# Patient Record
Sex: MALE | Race: WHITE | NOT HISPANIC OR LATINO | URBAN - METROPOLITAN AREA
[De-identification: names, ages, dates, MRNs, and addresses within clinical notes are randomized per-mention and may not be internally consistent; named-entity substitution may affect disease eponyms.]

---

## 2017-11-27 ENCOUNTER — IMPORTED ENCOUNTER (OUTPATIENT)
Dept: URBAN - METROPOLITAN AREA CLINIC 1 | Facility: CLINIC | Age: 72
End: 2017-11-27

## 2017-11-27 PROBLEM — E11.9: Noted: 2017-11-27

## 2017-11-27 PROBLEM — Z96.1: Noted: 2017-11-27

## 2017-11-27 PROBLEM — Z79.84: Noted: 2017-11-27

## 2017-11-27 PROBLEM — H35.3111: Noted: 2017-11-27

## 2017-11-27 PROBLEM — H01.002: Noted: 2017-11-27

## 2017-11-27 PROBLEM — H04.123: Noted: 2017-11-27

## 2017-11-27 PROBLEM — H01.005: Noted: 2017-11-27

## 2017-11-27 PROBLEM — H16.143: Noted: 2017-11-27

## 2017-11-27 PROBLEM — H26.493: Noted: 2017-11-27

## 2017-11-27 PROCEDURE — 92014 COMPRE OPH EXAM EST PT 1/>: CPT

## 2017-11-27 NOTE — PATIENT DISCUSSION
1.  DM Type II without sign of diabetic retinopathy and no blot heme on dilated retinal examination today OU No Macular Edema: Stable. Discussed the pathophysiology of diabetes and its effect on the eye and risk of blindness. Stressed the importance of strong glucose control. Advised of importance of at least yearly dilated examinations but to contact us immediately for any problems or concerns. 2. Type II diabetes controlled by oral medications. 3.   ARMD OD early/dry/stable. Importance of daily AREDS II study multivitamin and Amsler Grid checks discussed with patient. Patient to follow-up immediately with any new onset of decreased vision and/or metamorphopsia. 4. JAKE w/ PEK OU: Stable. The continuation of artificial tears were recommended. 5.  PCO OU: Observe and consider yag cap when pt feels pco visually significant and visual acuity decreases to appropriate level. 6. Blepharitis anterior type OU- Continue daily warm compresses and lid scrubs were recommended. 7. Pseudophakia OU- Doing well. 8.  Patient defers the refraction at today's visit    9. Return for an appointment for a 27 in 1 year with Dr. Edu Pyle.

## 2018-11-26 ENCOUNTER — IMPORTED ENCOUNTER (OUTPATIENT)
Dept: URBAN - METROPOLITAN AREA CLINIC 1 | Facility: CLINIC | Age: 73
End: 2018-11-26

## 2018-11-26 PROBLEM — H16.143: Noted: 2018-11-26

## 2018-11-26 PROBLEM — H35.3111: Noted: 2018-11-26

## 2018-11-26 PROBLEM — H01.002: Noted: 2018-11-26

## 2018-11-26 PROBLEM — Z96.1: Noted: 2018-11-26

## 2018-11-26 PROBLEM — H04.123: Noted: 2018-11-26

## 2018-11-26 PROBLEM — E11.9: Noted: 2018-11-26

## 2018-11-26 PROBLEM — H01.005: Noted: 2018-11-26

## 2018-11-26 PROBLEM — Z79.84: Noted: 2018-11-26

## 2018-11-26 PROCEDURE — 92014 COMPRE OPH EXAM EST PT 1/>: CPT

## 2018-11-26 NOTE — PATIENT DISCUSSION
1.  DM Type II without sign of diabetic retinopathy and no blot heme on dilated retinal examination today OU No Macular Edema: Stable. Discussed the pathophysiology of diabetes and its effect on the eye and risk of blindness. Stressed the importance of strong glucose control. Advised of importance of at least yearly dilated examinations but to contact us immediately for any problems or concerns. 2. Type II diabetes controlled by oral medications. 3.  JAKE w/ PEK OU- Slightly progressed OU. The continuation of artificial tears OU QID were recommended. Consider plugs if no improvement. 4.  Blepharitis anterior type OU- Continue daily hot compresses and lid scrubs were recommended. 5. ARMD OD Early/dry/stable. Importance of daily AREDS II study multivitamin and Amsler Grid checks discussed with patient. Patient to follow-up immediately with any new onset of decreased vision and/or metamorphopsia. 6. Pseudophakia OU - Doing well. 7. Return for an appointment for 27 in 1 year with Dr. Izzy Dickson.

## 2018-11-26 NOTE — PATIENT DISCUSSION
ARMD OD Early/dry/stable. Importance of daily AREDS II study multivitamin and Amsler Grid checks discussed with patient. Patient to follow-up immediately with any new onset of decreased vision and/or metamorphopsia.

## 2019-06-10 ENCOUNTER — HOSPITAL ENCOUNTER (OUTPATIENT)
Dept: PHYSICAL THERAPY | Age: 74
Discharge: HOME | End: 2019-06-10
Payer: MEDICARE

## 2019-06-10 PROCEDURE — 97110 THERAPEUTIC EXERCISES: CPT | Performed by: PHYSICAL THERAPIST

## 2019-06-10 PROCEDURE — 97162 PT EVAL MOD COMPLEX 30 MIN: CPT | Performed by: PHYSICAL THERAPIST

## 2019-06-10 NOTE — PROGRESS NOTES
[address] [facility] AT [facility]  [address] [facility] [address], [facility], [place]  Phone [phone]  Fax [phone] / [address] [address] PHYSICAL THERAPY SERVICES  Patient Name: Layla Hernandez Sr. : 1945   Medical   Diagnosis: Right knee pain [M25.561] Treatment Diagnosis: R knee pain   Onset Date: 19     Ref[facility] Source[facility] Ke[facility] Gowers, [facility] [facility] 6/10/2019   Prior Hospitalization: See medical history Provider #: [id number]   Prior Level of Function: IND   Comorbidities: Diabetes, HBP   Medications: Verified on Patient Summary List   The Plan of Care and following information is based on the information from the initial evaluation.   =================================================================================  Assessment / key information:  Pt is a 74year old male sp R TKA on 19 and he was released from the hospital on 19. Currently he rates his R knee pain a 4-5/10. He presents with miranda hose and bandage on the R knee that has to be on until his next FU on 19. Functional limitations are consistent with recent surgical interventions. Pain is managed with pain medication. Negative for red flags. FOTO: 50/100. Upon evaluation, pt ambulates without AD demonstrating decreased TKE on the R. Pt is able to ascend/descend the stairs with reciprocal pattern demonstrating decreased eccentric quad control on the R. Unable to assess incision secondary to miranda hose and bandage around the R knee. AROM of the R knee 0-122 deg, L knee AROM: 0-126 deg. Pt was able to perform SLR without quad lag on the R.  Pt would benefit from a course of skilled PT to address above deficits to improve functional mobility.   =================================================================================  Eval Complexity: History: MEDIUM  Complexity : 1-2 comorbidities / personal factors will impact the outcome/ POC Exam:HIGH Complexity : 4+ Standardized tests and measures addressing body structure, function, activity limitation and / or participation in recreation  Presentation: MEDIUM Complexity : Evolving with changing characteristics  Clinical Decision Making:MEDIUM Complexity : FOTO score of 26-74Overall Complexity:MEDIUM  Problem List: pain affecting function, decrease ROM, decrease strength, edema affecting function, impaired gait/ balance, decrease ADL/ functional abilitiies, decrease activity tolerance, decrease flexibility/ joint mobility and decrease transfer abilities   Treatment Plan may include any combination of the following: Therapeutic exercise, Therapeutic activities, Neuromuscular re-education, Physical agent/modality, Gait/balance training, Manual therapy and Patient education  Patient / Family readiness to learn indicated by: asking questions and trying to perform skills  Persons(s) to be included in education: patient (P)  Barriers to Learning/Limitations: None  Measures taken:    Patient Goal (s): Get back to normal   Patient self reported health status: good  Rehabilitation Potential: good   Short Term Goals: To be accomplished in  2  weeks:  1. Pt will be IND and compliant with HEP for self-management of symptoms.  Long Term Goals: To be accomplished in  10  weeks:  1. Pt will improve R knee strength 5/5 to improve gait mechanics. 2. Pt will improve eccentric quad strength as noted with ability to perform 6\" step down. 3. Pt will improve B hip strength to 5/5 to improve gait stability on unlevel surfaces. 4. Pt will improve FOTO score to at least 60/100 as a functional indicator of improved mobility.      Frequency / Duration:   Patient to be seen  2-3  times per week for 10  treatments:  Patient / Caregiver education and instruction: exercises  G-Codes (GP): EMILY  Therapist Signature: Aron Henriquez DPT Date: 2/19/7914   Certification Period: 6/10/19-9/9/19 Time: 5:06 PM ===========================================================================================  I certify that the above Physical Therapy Services are being furnished while the patient is under my care. I agree with the treatment plan and certify that this therapy is necessary. Physician Signature:        Date:       Time:     Please sign and return to [facility] or you may fax the signed copy to [phone]. Thank you.

## 2019-06-10 NOTE — PROGRESS NOTES
PT DAILY TREATMENT NOTE - [facility] 3-16    Patient Name: Marca Dakin.  Date:6/10/2019  : 1945  [x]  Patient  Verified  Payor: Janeth Nguyen / Plan: [place] & [place] / Product Type: Medicare /    In time:1140  Out time:1210  Total Treatment Time (min): 30  Total Timed Codes (min): 10  1:1 Treatment Time ( [address] only): 30   Visit #: 1 of 10    Treatment Area: Right knee pain [M25.561]    SUBJECTIVE  Pain Level (0-10 scale): 4-5/10  Any medication changes, allergies to medications, adverse drug reactions, diagnosis change, or new procedure performed?: [x] No    [] Yes (see summary sheet for update)  Subjective functional status/changes:   [] No changes reported  Pt is a 74year old male sp R TKA on 19 and he was released from the hospital on 19. Currently he rates his R knee pain a 4-5/10. He presents with miranda hose and bandage on the R knee that has to be on until his next FU on 19. Functional limitations are consistent with recent surgical interventions. Pain is managed with pain medication. Negative for red flags.  FOTO: 50/100      OBJECTIVE    Modality rationale: decrease edema, decrease inflammation and decrease pain to improve the patients ability to decrease pain post treatment    Min Type Additional Details    [] Estim:  []Unatt       []IFC  []Premod                        []Other:  []w/ice   []w/heat  Position:  Location:    [] Estim: []Att    []TENS instruct  []NMES                    []Other:  []w/US   []w/ice   []w/heat  Position:  Location:    []  Traction: [] Cervical       []Lumbar                       [] Prone          []Supine                       []Intermittent   []Continuous Lbs:  [] before manual  [] after manual    []  Ultrasound: []Continuous   [] Pulsed                           []1MHz   []3MHz Location:  W/cm2:    []  Iontophoresis with dexamethasone         Location: [] Take home patch   [] In clinic   With review HEP  [x]  Ice     []  heat  []  Ice massage  [] Laser   []  Anodyne Position: supine  Location: R knee     []  Laser with stim  []  Other: Position:  Location:    []  Vasopneumatic Device Pressure:       [] lo [] med [] hi   Temperature: [] lo [] med [] hi   [] Skin assessment post-treatment:  []intact []redness- no adverse reaction    []redness - adverse reaction:     20 min [x]Eval                  []Re-Eval       10 min Therapeutic Exercise:  [x] See flow sheet : Reviewed HEP with pt   Rationale: increase ROM and increase strength to improve the patients ability to improve ADL tolerance           With   [x] TE   [] TA   [] neuro   [] other: Patient Education: [x] Review HEP    [] Progressed/Changed HEP based on:   [] positioning   [] body mechanics   [] transfers   [] heat/ice application    [] Graston Education: Explained the effects and benefits of Graston Technique therapy including potential for post treatment soreness and bruising. [] Other:      Other Objective/Functional Measures:  Upon evaluation, pt ambulates without AD demonstrating decreased TKE on the R. Pt is able to ascend/descend the stairs with reciprocal pattern demonstrating decreased eccentric quad control on the R. Unable to assess incision secondary to miranda hose and bandage around the R knee. AROM of the R knee 0-122 deg, L knee AROM: 0-126 deg.  Pt was able to perform SLR without quad lag on the R.      Pain Level (0-10 scale) post treatment: 3-4/10    ASSESSMENT/Changes in Function:   [x]  See Plan of Care  []  See progress note/recertification  []  See Discharge Summary         Progress towards goals / Updated goals:  PER POC    PLAN  []  Upgrade activities as tolerated     [x]  Continue plan of care  []  Update interventions per flow sheet       []  Discharge due to:_  [x]  Other:2-3x/week for 4 weeks     Justification for Eval Code Complexity:  Patient History : arthritis   Examination see exam   Clinical Presentation: evolving  Clinical Decision Making : FOTO : 50 /100 Orlando Wright DPT 6/10/2019  11:39 AM

## 2019-06-11 ENCOUNTER — HOSPITAL ENCOUNTER (OUTPATIENT)
Dept: PHYSICAL THERAPY | Age: 74
Discharge: HOME | End: 2019-06-11
Payer: MEDICARE

## 2019-06-11 PROCEDURE — 97110 THERAPEUTIC EXERCISES: CPT | Performed by: PHYSICAL THERAPIST

## 2019-06-11 NOTE — PROGRESS NOTES
PT DAILY TREATMENT NOTE - [facility] 3-16    Patient Name: Jordy Vergara Sr.  Date:2019  : 1945  [x]  Patient  Verified  Payor: Xiomy Vivar / Plan: [place] MEDICARE PART A & B / Product Type: Medicare /    In time:924  Out time:1019  Total Treatment Time (min): 55  Total Timed Codes (min): 45  1:1 Treatment Time ( [address] only): 24   Visit #: 2 of 10    Treatment Area: Right knee pain [M25.561]    SUBJECTIVE  Pain Level (0-10 scale): 0/10  Any medication changes, allergies to medications, adverse drug reactions, diagnosis change, or new procedure performed?: [x] No    [] Yes (see summary sheet for update)  Subjective functional status/changes:   [x] No changes reported      OBJECTIVE    Modality rationale: decrease edema, decrease inflammation and decrease pain to improve the patients ability to improve post session sorneess    Min Type Additional Details    [] Estim:  []Unatt       []IFC  []Premod                        []Other:  []w/ice   []w/heat  Position:  Location:    [] Estim: []Att    []TENS instruct  []NMES                    []Other:  []w/US   []w/ice   []w/heat  Position:  Location:    []  Traction: [] Cervical       []Lumbar                       [] Prone          []Supine                       []Intermittent   []Continuous Lbs:  [] before manual  [] after manual    []  Ultrasound: []Continuous   [] Pulsed                           []1MHz   []3MHz Location:  W/cm2:    []  Iontophoresis with dexamethasone         Location: [] Take home patch   [] In clinic   10 [x]  Ice     []  heat  []  Ice massage  []  Laser   []  Anodyne Position: R knee   Location: semi-reclined    []  Laser with stim  []  Other: Position:  Location:    []  Vasopneumatic Device Pressure:       [] lo [] med [] hi   Temperature: [] lo [] med [] hi   [] Skin assessment post-treatment:  []intact []redness- no adverse reaction    []redness - adverse reaction:     55 min Therapeutic Exercise:  [] See flow sheet : initiated PT POC Rationale: increase ROM and increase strength to improve the patients ability to improve gait mechanics        With   [] TE   [] TA   [] neuro   [] other: Patient Education: [x] Review HEP    [] Progressed/Changed HEP based on:   [] positioning   [] body mechanics   [] transfers   [] heat/ice application    [] Graston Education: Explained the effects and benefits of Graston Technique therapy including potential for post treatment soreness and bruising. [] Other:      Other Objective/Functional Measures:   Noted quad weakness with functional standing therex   AROM of the R knee 0-122 deg    Pain Level (0-10 scale) post treatment: 0/10    ASSESSMENT/Changes in Function:   Pt tolerated initiation of care without increased pain or discomfort in the R knee. Noted quad weakness secondary to swelling leading to tendon inhibition. Pt educated to elevate the R LE throughout the day to minimize swelling. Pt verbalized understanding. Patient will continue to benefit from skilled PT services to modify and progress therapeutic interventions, address functional mobility deficits, address ROM deficits, address strength deficits, analyze and address soft tissue restrictions and address imbalance/dizziness to attain remaining goals.      []  See Plan of Care  []  See progress note/recertification  []  See Discharge Summary         Progress towards goals / Updated goals:  1st FU visit, initiated PT POC    PLAN  []  Upgrade activities as tolerated     [x]  Continue plan of care  []  Update interventions per flow sheet       []  Discharge due to:_  [x]  Other: check goals    Winston Lopez DPT 6/11/2019  11:11 AM

## 2019-06-13 ENCOUNTER — HOSPITAL ENCOUNTER (OUTPATIENT)
Dept: PHYSICAL THERAPY | Age: 74
Discharge: HOME | End: 2019-06-13
Payer: MEDICARE

## 2019-06-13 PROCEDURE — 97110 THERAPEUTIC EXERCISES: CPT

## 2019-06-13 PROCEDURE — 97014 ELECTRIC STIMULATION THERAPY: CPT

## 2019-06-13 PROCEDURE — 97140 MANUAL THERAPY 1/> REGIONS: CPT

## 2019-06-14 ENCOUNTER — APPOINTMENT (OUTPATIENT)
Dept: PHYSICAL THERAPY | Age: 74
End: 2019-06-14
Payer: MEDICARE

## 2019-06-17 ENCOUNTER — HOSPITAL ENCOUNTER (OUTPATIENT)
Dept: PHYSICAL THERAPY | Age: 74
Discharge: HOME | End: 2019-06-17
Payer: MEDICARE

## 2019-06-17 ENCOUNTER — IMPORTED ENCOUNTER (OUTPATIENT)
Dept: URBAN - METROPOLITAN AREA CLINIC 1 | Facility: CLINIC | Age: 74
End: 2019-06-17

## 2019-06-17 PROBLEM — H43.811: Noted: 2019-06-17

## 2019-06-17 PROCEDURE — 92012 INTRM OPH EXAM EST PATIENT: CPT

## 2019-06-17 PROCEDURE — 97110 THERAPEUTIC EXERCISES: CPT | Performed by: PHYSICAL THERAPIST

## 2019-06-17 NOTE — PROGRESS NOTES
PT DAILY TREATMENT NOTE - [facility]    Patient Name: Omar Chou.  Date:2019  : 1945  [x]  Patient  Verified  Payor: Christina Ashford / Plan: [place] MEDICARE PART A & B / Product Type: Medicare /    In time: 56  Out time:356  Total Treatment Time (min): 62  Total Timed Codes (min): 62  1:1 Treatment Time ( [address] only): 62  Visit #: 4 of 10    Treatment Area: Right knee pain [M25.561]    SUBJECTIVE  Pain Level (0-10 scale): 0/10  Any medication changes, allergies to medications, adverse drug reactions, diagnosis change, or new procedure performed?: [x] No    [] Yes (see summary sheet for update)  Subjective functional status/changes:   [x] No changes reported  Pt saw the doctor. He said I am doing good and to make an appt in 4-6 weeks.        OBJECTIVE    Modality rationale: decrease edema, decrease inflammation and decrease pain to improve the patients ability to improve post session sorneess    Min Type Additional Details    [] Estim:  []Unatt       []IFC  []Premod                        []Other:  []w/ice   []w/heat  Position:  Location:    [] Estim: []Att    []TENS instruct  []NMES                    []Other:  []w/US   []w/ice   []w/heat  Position:  Location:    []  Traction: [] Cervical       []Lumbar                       [] Prone          []Supine                       []Intermittent   []Continuous Lbs:  [] before manual  [] after manual    []  Ultrasound: []Continuous   [] Pulsed                           []1MHz   []3MHz Location:  W/cm2:    []  Iontophoresis with dexamethasone         Location: [] Take home patch   [] In clinic    []  Ice     []  heat  []  Ice massage  []  Laser   []  Anodyne Position: R knee   Location: semi-reclined    []  Laser with stim  []  Other: Position:  Location:    []  Vasopneumatic Device Pressure:       [] lo [] med [] hi   Temperature: [] lo [] med [] hi   [] Skin assessment post-treatment:  []intact []redness- no adverse reaction    []redness - adverse reaction: 62 min Therapeutic Exercise:  [x] See flow sheet : 5 min NC for warm up    Rationale: increase ROM and increase strength to improve the patients ability to improve gait mechanics        With   [] TE   [] TA   [] neuro   [] other: Patient Education: [x] Review HEP    [] Progressed/Changed HEP based on:   [] positioning   [] body mechanics   [] transfers   [] heat/ice application    [] Graston Education: Explained the effects and benefits of Graston Technique therapy including potential for post treatment soreness and bruising. [] Other:      Other Objective/Functional Measures:  VC's for correct therex technique throughout  Incision is healing, no signs of infection  Increased knee swelling in the R knee     Pain Level (0-10 scale) post treatment: 0/10    ASSESSMENT/Changes in Function:   Pt tolerated progressed PREs without increased pain in the R knee. Continue to progress as tolerated. Patient will continue to benefit from skilled PT services to modify and progress therapeutic interventions, address functional mobility deficits, address ROM deficits, address strength deficits, analyze and address soft tissue restrictions and address imbalance/dizziness to attain remaining goals. Progress towards goals / Updated goals: · Short Term Goals: To be accomplished in  2  weeks:  1. Pt will be IND and compliant with HEP for self-management of symptoms. initiated this goals with giving patient HEP 6/13/19   · Long Term Goals: To be accomplished in  10  weeks:  1. Pt will improve R knee strength 5/5 to improve gait mechanics. 2. Pt will improve eccentric quad strength as noted with ability to perform 6\" step down. 3. Pt will improve B hip strength to 5/5 to improve gait stability on unlevel surfaces. 4. Pt will improve FOTO score to at least 60/100 as a functional indicator of improved mobility.          PLAN  []  Upgrade activities as tolerated     [x]  Continue plan of care  []  Update interventions per flow sheet       []  Discharge due to:_  [x]  Other: check goals    Meredith Desouza DPT 6/17/2019  406 PM

## 2019-06-17 NOTE — PATIENT DISCUSSION
1.  PVD w/o Tear OD (no pigmented cells seen or found on today's exam) -- RD Precautions given. No Retinal Tear Hole or Detachment seen or found on today's exam. Patient was cautioned to call our office immediately if they experience   a substantial change in their symptoms such as an increase in floaters persistent flashes loss of visual field (may appear as a shadow or a curtain) or decrease in visual acuity as these may indicate a retinal tear or detachment. 2.  JAKE w/ PEK OU -- The continuation of artificial tears OU QID were recommended. Consider plugs if no improvement. 3.  Blepharitis OU -- Continue daily hot compresses and lid scrubs were recommended. 4. PCO -- Observe. 5. Pseudophakia OU -- Doing well.6. H/o Early Dry ARMD OD7. H/o DM Type II w/o DR / DME [facility] for an appointment in 4-6 WKS for a 10 / DFE (PVD Recheck OD) with Dr. Aria Walsh.

## 2019-06-18 ENCOUNTER — APPOINTMENT (OUTPATIENT)
Dept: PHYSICAL THERAPY | Age: 74
End: 2019-06-18
Payer: MEDICARE

## 2019-06-19 ENCOUNTER — HOSPITAL ENCOUNTER (OUTPATIENT)
Dept: PHYSICAL THERAPY | Age: 74
Discharge: HOME | End: 2019-06-19
Payer: MEDICARE

## 2019-06-19 PROCEDURE — 97140 MANUAL THERAPY 1/> REGIONS: CPT | Performed by: PHYSICAL THERAPIST

## 2019-06-19 PROCEDURE — 97014 ELECTRIC STIMULATION THERAPY: CPT | Performed by: PHYSICAL THERAPIST

## 2019-06-19 NOTE — PROGRESS NOTES
PT DAILY TREATMENT NOTE - [facility] 3-16    Patient Name: Lakeshia Bhatia.  Date:2019  : 1945  [x]  Patient  Verified  Payor: Lisa Reed / Plan: [place] MEDICARE PART A & B / Product Type: Medicare /    In time: 300  Out time: 351  Total Treatment Time (min): 51  Total Timed Codes (min): 36  1:1 Treatment Time ( only): 31  Visit #: 5 of 10    Treatment Area: Right knee pain [M25.561]    SUBJECTIVE  Pain Level (0-10 scale): 3/10  Any medication changes, allergies to medications, adverse drug reactions, diagnosis change, or new procedure performed?: [x] No    [] Yes (see summary sheet for update)  Subjective functional status/changes:   [x] No changes reported  Pt reports increased pain and swelling in the R knee this session.        OBJECTIVE    Modality rationale: decrease edema, decrease inflammation and decrease pain to improve the patients ability to improve post session sorneess    Min Type Additional Details   15 [x] Estim:  [x]Unatt       [x]IFC  []Premod                        []Other:  [x]w/ice   []w/heat  Position: supine with R LE elevated  Location: R knee     [] Estim: []Att    []TENS instruct  []NMES                    []Other:  []w/US   []w/ice   []w/heat  Position:  Location:    []  Traction: [] Cervical       []Lumbar                       [] Prone          []Supine                       []Intermittent   []Continuous Lbs:  [] before manual  [] after manual    []  Ultrasound: []Continuous   [] Pulsed                           []1MHz   []3MHz Location:  W/cm2:    []  Iontophoresis with dexamethasone         Location: [] Take home patch   [] In clinic    []  Ice     []  heat  []  Ice massage  []  Laser   []  Anodyne Position: R knee   Location: semi-reclined    []  Laser with stim  []  Other: Position:  Location:    []  Vasopneumatic Device Pressure:       [] lo [] med [] hi   Temperature: [] lo [] med [] hi   [] Skin assessment post-treatment:  []intact []redness- no adverse reaction []redness - adverse reaction:     36 min Manual therapy:  [x] See flow sheet : 5 min NC for warm up, patellar mobs grade III, edema massage of the R knee    Rationale: increase ROM and increase strength to improve the patients ability to improve gait mechanics        With   [] TE   [] TA   [] neuro   [] other: Patient Education: [x] Review HEP    [] Progressed/Changed HEP based on:   [] positioning   [] body mechanics   [] transfers   [] heat/ice application    [] Graston Education: Explained the effects and benefits of Graston Technique therapy including potential for post treatment soreness and bruising. [] Other:      Other Objective/Functional Measures:  Increased knee swelling in the R knee   Noted medial patellar hypomobility on the R knee   Pain Level (0-10 scale) post treatment: 0/10    ASSESSMENT/Changes in Function:   Held strengthening this session secondary to increased swelling and discomfort in the R knee. Therefore added manual interventions. Assess effects next session. Pt poor tolerance to supine with R LE elevated position. Continue strengthening next session. Patient will continue to benefit from skilled PT services to modify and progress therapeutic interventions, address functional mobility deficits, address ROM deficits, address strength deficits, analyze and address soft tissue restrictions and address imbalance/dizziness to attain remaining goals. Progress towards goals / Updated goals: · Short Term Goals: To be accomplished in  2  weeks:  1. Pt will be IND and compliant with HEP for self-management of symptoms. initiated this goals with giving patient HEP 6/13/19   · Long Term Goals: To be accomplished in  10  weeks:  1. Pt will improve R knee strength 5/5 to improve gait mechanics. 2. Pt will improve eccentric quad strength as noted with ability to perform 6\" step down. 3. Pt will improve B hip strength to 5/5 to improve gait stability on unlevel surfaces.   4. Pt will improve FOTO score to at least 60/100 as a functional indicator of improved mobility.          PLAN  []  Upgrade activities as tolerated     [x]  Continue plan of care  []  Update interventions per flow sheet       []  Discharge due to:_  [x]  Other: check goals    Tran Guadarrama DPT 6/19/2019  357  PM

## 2019-06-20 ENCOUNTER — APPOINTMENT (OUTPATIENT)
Dept: PHYSICAL THERAPY | Age: 74
End: 2019-06-20
Payer: MEDICARE

## 2019-06-20 ENCOUNTER — HOSPITAL ENCOUNTER (OUTPATIENT)
Dept: PHYSICAL THERAPY | Age: 74
Discharge: HOME | End: 2019-06-20
Payer: MEDICARE

## 2019-06-20 PROCEDURE — 97140 MANUAL THERAPY 1/> REGIONS: CPT

## 2019-06-20 PROCEDURE — 97110 THERAPEUTIC EXERCISES: CPT

## 2019-06-20 PROCEDURE — 97014 ELECTRIC STIMULATION THERAPY: CPT

## 2019-06-20 NOTE — PROGRESS NOTES
PT DAILY TREATMENT NOTE - [facility] 3-16    Patient Name: Sami Lam.  Date:2019  : 1945  [x]  Patient  Verified  Payor: Emily Lima / Plan: [place] MED[place] & [place] / Product Type: Medicare /    In time: 3:39  Out time: 4:35  Total Treatment Time (min): 56  Total Timed Codes (min): 56  1:1 Treatment Time ( [address] only): 3:45-4:20 (35)  Visit #: 6 of 10    Treatment Area: Right knee pain [M25.561]    SUBJECTIVE  Pain Level (0-10 scale): 10  Any medication changes, allergies to medications, adverse drug reactions, diagnosis change, or new procedure performed?: [x] No    [] Yes (see summary sheet for update)  Subjective functional status/changes:   [x] No changes reported  Some days are good and some are bad.      OBJECTIVE    Modality rationale: decrease edema, decrease inflammation and decrease pain to improve the patients ability to improve post session sorneess    Min Type Additional Details   15 [x] Estim:  [x]Unatt       [x]IFC  []Premod                        []Other:  [x]w/ice   []w/heat  Position: long sitting on plinth with TR under right knee  Location: R knee     [] Estim: []Att    []TENS instruct  []NMES                    []Other:  []w/US   []w/ice   []w/heat  Position:  Location:    []  Traction: [] Cervical       []Lumbar                       [] Prone          []Supine                       []Intermittent   []Continuous Lbs:  [] before manual  [] after manual    []  Ultrasound: []Continuous   [] Pulsed                           []1MHz   []3MHz Location:  W/cm2:    []  Iontophoresis with dexamethasone         Location: [] Take home patch   [] In clinic    []  Ice     []  heat  []  Ice massage  []  Laser   []  Anodyne Position: R knee   Location: semi-reclined    []  Laser with stim  []  Other: Position:  Location:    []  Vasopneumatic Device Pressure:       [] lo [] med [] hi   Temperature: [] lo [] med [] hi   [x] Skin assessment post-treatment:  [x]intact []redness- no adverse reaction []redness - adverse reaction:     31 min Therapeutic Exercise:  [x]  See flow sheet :   Rationale: increase ROM and increase strength to improve the patients ability to perform functional ADL's. 10 min Manual Therapy:  [x] See flow sheet : patellar mobs grade III, edema massage of the R knee    Rationale: increase ROM and increase strength to improve the patients ability to improve gait mechanics        With   [] TE   [] TA   [] neuro   [] other: Patient Education: [x] Review HEP    [] Progressed/Changed HEP based on:   [] positioning   [] body mechanics   [] transfers   [] heat/ice application    [] Graston Education: Explained the effects and benefits of Graston Technique therapy including potential for post treatment soreness and bruising. [] Other:      Other Objective/Functional Measures:   - Continued increased knee swelling in the R knee  - Moderate challenge with program, no increase in pain   - Attempted IFC with change of positioning, assess effects NV     Pain Level (0-10 scale) post treatment: 0/10    ASSESSMENT/Changes in Function:   Continued with strengthening this session with fair tolerance by patient. No increase in pain, however patient continues to have swelling in right knee addressed with MT and IFC/CP. Patient reports increased tolerance to IFC with change of positioning. Patient will continue to benefit from skilled PT services to modify and progress therapeutic interventions, address functional mobility deficits, address ROM deficits, address strength deficits, analyze and address soft tissue restrictions and address imbalance/dizziness to attain remaining goals. Progress towards goals / Updated goals: · Short Term Goals: To be accomplished in  2  weeks:  1. Pt will be IND and compliant with HEP for self-management of symptoms. initiated this goals with giving patient HEP 6/13/19   · Long Term Goals: To be accomplished in  10  weeks:  1.  Pt will improve R knee strength 5/5 to improve gait mechanics. 2. Pt will improve eccentric quad strength as noted with ability to perform 6\" step down. 3. Pt will improve B hip strength to 5/5 to improve gait stability on unlevel surfaces. 4. Pt will improve FOTO score to at least 60/100 as a functional indicator of improved mobility.      PLAN  []  Upgrade activities as tolerated     [x]  Continue plan of care  []  Update interventions per flow sheet       []  Discharge due to:_  []  Other: check goals    Angela Vidal 6/20/2019  4:35  PM

## 2019-06-24 ENCOUNTER — HOSPITAL ENCOUNTER (OUTPATIENT)
Dept: PHYSICAL THERAPY | Age: 74
Discharge: HOME | End: 2019-06-24
Payer: MEDICARE

## 2019-06-24 PROCEDURE — 97110 THERAPEUTIC EXERCISES: CPT | Performed by: PHYSICAL THERAPIST

## 2019-06-24 NOTE — PROGRESS NOTES
PT DAILY TREATMENT NOTE - [facility] 316    Patient Name: Wild Campbell.  Date:2019  : 1945  [x]  Patient  Verified  Payor: Doroteo Solders / Plan: [place] MEDICARE PART A & B / Product Type: Medicare /    In time: 3:30  Out time: 445  Total Treatment Time (min): 75  Total Timed Codes (min): 60  1:1 Treatment Time ( only): 60  Visit #: 7 of 10    Treatment Area: Right knee pain [M25.561]    SUBJECTIVE  Pain Level (0-10 scale): 2/10  Any medication changes, allergies to medications, adverse drug reactions, diagnosis change, or new procedure performed?: [x] No    [] Yes (see summary sheet for update)  Subjective functional status/changes:   [x] No changes reported  The electrodes feel good until about 30 min afterwards and then it starts to hurt worse than it did before I came in.      OBJECTIVE    Modality rationale: decrease edema, decrease inflammation and decrease pain to improve the patients ability to improve post session sorneess    Min Type Additional Details   H [x] Estim:  [x]Unatt       [x]IFC  []Premod                        []Other:  [x]w/ice   []w/heat  Position: long sitting on plinth with TR under right knee  Location: R knee     [] Estim: []Att    []TENS instruct  []NMES                    []Other:  []w/US   []w/ice   []w/heat  Position:  Location:    []  Traction: [] Cervical       []Lumbar                       [] Prone          []Supine                       []Intermittent   []Continuous Lbs:  [] before manual  [] after manual    []  Ultrasound: []Continuous   [] Pulsed                           []1MHz   []3MHz Location:  W/cm2:    []  Iontophoresis with dexamethasone         Location: [] Take home patch   [] In clinic   15 [x]  Ice     []  heat  []  Ice massage  []  Laser   []  Anodyne Position: R knee   Location: semi-reclined    []  Laser with stim  []  Other: Position:  Location:    []  Vasopneumatic Device Pressure:       [] lo [] med [] hi   Temperature: [] lo [] med [] hi   [x] Skin assessment post-treatment:  [x]intact []redness- no adverse reaction    []redness - adverse reaction:     60 min Therapeutic Exercise:  [x]  See flow sheet :   Rationale: increase ROM and increase strength to improve the patients ability to perform functional ADL's. H min Manual Therapy:  [x] See flow sheet : patellar mobs grade III, edema massage of the R knee    Rationale: increase ROM and increase strength to improve the patients ability to improve gait mechanics        With   [] TE   [] TA   [] neuro   [] other: Patient Education: [x] Review HEP    [] Progressed/Changed HEP based on:   [] positioning   [] body mechanics   [] transfers   [] heat/ice application    [] Graston Education: Explained the effects and benefits of Graston Technique therapy including potential for post treatment soreness and bruising. [] Other:      Other Objective/Functional Measures:   Decreased swelling noted this session  No signs of redness  VCs to perform hip extension with correct for,    Pain Level (0-10 scale) post treatment: 0/10    ASSESSMENT/Changes in Function:   Held estim secondary to reports of increased pain following treatment. Pt tolerated progressed therex without increased pain. Consider adding ankle weights next session. Patient will continue to benefit from skilled PT services to modify and progress therapeutic interventions, address functional mobility deficits, address ROM deficits, address strength deficits, analyze and address soft tissue restrictions and address imbalance/dizziness to attain remaining goals. Progress towards goals / Updated goals: · Short Term Goals: To be accomplished in  2  weeks:  1. Pt will be IND and compliant with HEP for self-management of symptoms. initiated this goals with giving patient HEP 6/13/19   · Long Term Goals: To be accomplished in  10  weeks:  1. Pt will improve R knee strength 5/5 to improve gait mechanics.   2. Pt will improve eccentric quad strength as noted with ability to perform 6\" step down. 3. Pt will improve B hip strength to 5/5 to improve gait stability on unlevel surfaces. 4. Pt will improve FOTO score to at least 60/100 as a functional indicator of improved mobility.      PLAN  []  Upgrade activities as tolerated     [x]  Continue plan of care  []  Update interventions per flow sheet       []  Discharge due to:_  []  Other: check goals    Antoine Russ DPT, 6/24/2019  608   PM

## 2019-06-25 ENCOUNTER — HOSPITAL ENCOUNTER (OUTPATIENT)
Dept: PHYSICAL THERAPY | Age: 74
Discharge: HOME | End: 2019-06-25
Payer: MEDICARE

## 2019-06-25 ENCOUNTER — APPOINTMENT (OUTPATIENT)
Dept: PHYSICAL THERAPY | Age: 74
End: 2019-06-25
Payer: MEDICARE

## 2019-06-25 PROCEDURE — 97014 ELECTRIC STIMULATION THERAPY: CPT | Performed by: PHYSICAL THERAPIST

## 2019-06-25 PROCEDURE — 97110 THERAPEUTIC EXERCISES: CPT | Performed by: PHYSICAL THERAPIST

## 2019-06-25 NOTE — PROGRESS NOTES
PT DAILY TREATMENT NOTE - [facility] 3-16    Patient Name: Wild Campbell.  Date:2019  : 1945  [x]  Patient  Verified  Payor: Doroteo Solders / Plan: [place] MEDICARE PART A & B / Product Type: Medicare /    In time: 3:10  Out time: 405  Total Treatment Time (min): 55  Total Timed Codes (min): 40  1:1 Treatment Time ( only): 40  Visit #: 8 of 10    Treatment Area: Right knee pain [M25.561]    SUBJECTIVE  Pain Level (0-10 scale): 5/10  Any medication changes, allergies to medications, adverse drug reactions, diagnosis change, or new procedure performed?: [x] No    [] Yes (see summary sheet for update)  Subjective functional status/changes:   [x] No changes reported  Pt reports increased soreness after last session but he also walked around his yard after his appt.      OBJECTIVE    Modality rationale: decrease edema, decrease inflammation and decrease pain to improve the patients ability to improve post session sorneess    Min Type Additional Details   15 [x] Estim:  [x]Unatt       [x]IFC  []Premod                        []Other:  [x]w/ice   []w/heat  Position: long sitting on plinth with TR under right knee  Location: R knee     [] Estim: []Att    []TENS instruct  []NMES                    []Other:  []w/US   []w/ice   []w/heat  Position:  Location:    []  Traction: [] Cervical       []Lumbar                       [] Prone          []Supine                       []Intermittent   []Continuous Lbs:  [] before manual  [] after manual    []  Ultrasound: []Continuous   [] Pulsed                           []1MHz   []3MHz Location:  W/cm2:    []  Iontophoresis with dexamethasone         Location: [] Take home patch   [] In clinic   H [x]  Ice     []  heat  []  Ice massage  []  Laser   []  Anodyne Position: R knee   Location: semi-reclined    []  Laser with stim  []  Other: Position:  Location:    []  Vasopneumatic Device Pressure:       [] lo [] med [] hi   Temperature: [] lo [] med [] hi   [x] Skin assessment post-treatment:  [x]intact []redness- no adverse reaction    []redness - adverse reaction:     40 min Therapeutic Exercise:  [x]  See flow sheet :   Rationale: increase ROM and increase strength to improve the patients ability to perform functional ADL's. H min Manual Therapy:  [x] See flow sheet : patellar mobs grade III, edema massage of the R knee    Rationale: increase ROM and increase strength to improve the patients ability to improve gait mechanics        With   [] TE   [] TA   [] neuro   [] other: Patient Education: [x] Review HEP    [] Progressed/Changed HEP based on:   [] positioning   [] body mechanics   [] transfers   [] heat/ice application    [] Graston Education: Explained the effects and benefits of Graston Technique therapy including potential for post treatment soreness and bruising. [] Other:      Other Objective/Functional Measures:       Pain Level (0-10 scale) post treatment: 1-2/10    ASSESSMENT/Changes in Function:   Pt reports increase soreness and pain limiting progression of PT this session. Patient will continue to benefit from skilled PT services to modify and progress therapeutic interventions, address functional mobility deficits, address ROM deficits, address strength deficits, analyze and address soft tissue restrictions and address imbalance/dizziness to attain remaining goals. Progress towards goals / Updated goals:  Limited secondary to increased pain.  6/25/19    PLAN  []  Upgrade activities as tolerated     [x]  Continue plan of care  []  Update interventions per flow sheet       []  Discharge due to:_  [x]  Other: check goals    Tawanda Beaver DPT, 6/25/2019  619  PM

## 2019-06-27 ENCOUNTER — HOSPITAL ENCOUNTER (OUTPATIENT)
Dept: PHYSICAL THERAPY | Age: 74
Discharge: HOME | End: 2019-06-27
Payer: MEDICARE

## 2019-06-27 ENCOUNTER — APPOINTMENT (OUTPATIENT)
Dept: PHYSICAL THERAPY | Age: 74
End: 2019-06-27
Payer: MEDICARE

## 2019-06-27 PROCEDURE — 97110 THERAPEUTIC EXERCISES: CPT | Performed by: PHYSICAL THERAPIST

## 2019-06-27 PROCEDURE — 97140 MANUAL THERAPY 1/> REGIONS: CPT | Performed by: PHYSICAL THERAPIST

## 2019-06-27 NOTE — PROGRESS NOTES
PT DAILY TREATMENT NOTE - [facility] 3-16    Patient Name: Josy Barajas Sr.  Date:2019  : 1945  [x]  Patient  Verified  Payor: Kush Wolf / Plan: [place] MEDICARE PART A & B / Product Type: Medicare /    In time: 300  Out time: 405  Total Treatment Time (min): 65  Total Timed Codes (min): 55  1:1 Treatment Time ( only): 38  Visit #: 9 of 10    Treatment Area: Right knee pain [M25.561]    SUBJECTIVE  Pain Level (0-10 scale):3/10  Any medication changes, allergies to medications, adverse drug reactions, diagnosis change, or new procedure performed?: [x] No    [] Yes (see summary sheet for update)  Subjective functional status/changes:   [x] No changes reported  Pt notes that he still has the pain and swelling in his R knee. He states that the [facility] [place] still hurts.      OBJECTIVE    Modality rationale: decrease edema, decrease inflammation and decrease pain to improve the patients ability to improve post session sorneess    Min Type Additional Details   DC [x] Estim:  [x]Unatt       [x]IFC  []Premod                        []Other:  [x]w/ice   []w/heat  Position: long sitting on plinth with TR under right knee  Location: R knee     [] Estim: []Att    []TENS instruct  []NMES                    []Other:  []w/US   []w/ice   []w/heat  Position:  Location:    []  Traction: [] Cervical       []Lumbar                       [] Prone          []Supine                       []Intermittent   []Continuous Lbs:  [] before manual  [] after manual    []  Ultrasound: []Continuous   [] Pulsed                           []1MHz   []3MHz Location:  W/cm2:    []  Iontophoresis with dexamethasone         Location: [] Take home patch   [] In clinic   10 [x]  Ice     []  heat  []  Ice massage  []  Laser   []  Anodyne Position: R knee   Location: semi-reclined    []  Laser with stim  []  Other: Position:  Location:    []  Vasopneumatic Device Pressure:       [] lo [] med [] hi   Temperature: [] lo [] med [] hi   [x] Skin assessment post-treatment:  [x]intact []redness- no adverse reaction    []redness - adverse reaction:     40 min Therapeutic Exercise:  [x]  See flow sheet :   Rationale: increase ROM and increase strength to improve the patients ability to perform functional ADL's. 10 min Manual Therapy:  [x] See flow sheet : K-tape for edema   Rationale: increase ROM and increase strength to improve the patients ability to improve gait mechanics        With   [] TE   [] TA   [] neuro   [] other: Patient Education: [x] Review HEP    [] Progressed/Changed HEP based on:   [] positioning   [] body mechanics   [] transfers   [] heat/ice application    [] Graston Education: Explained the effects and benefits of Graston Technique therapy including potential for post treatment soreness and bruising. [] Other:      Other Objective/Functional Measures:   Incision appears to be healing and closed therefore, added k-tape for edema  Noted patellar hypermobility laterally  Reduced VMO activation with SLR    Pain Level (0-10 scale) post treatment: 2/10    ASSESSMENT/Changes in Function:   Continues to have pain and swelling limiting R quad control with functional activities. Continue to progress strengthening as tolerated. Patient will continue to benefit from skilled PT services to modify and progress therapeutic interventions, address functional mobility deficits, address ROM deficits, address strength deficits, analyze and address soft tissue restrictions and address imbalance/dizziness to attain remaining goals. Progress towards goals / Updated goals: · Short Term Goals: To be accomplished in  2  weeks:  1. Pt will be IND and compliant with HEP for self-management of symptoms. initiated this goals with giving patient HEP 6/13/19   · Long Term Goals: To be accomplished in  10  weeks:  1. Pt will improve R knee strength 5/5 to improve gait mechanics.   2. Pt will improve eccentric quad strength as noted with ability to perform 6\" step down. Limited secondary to pain and swelling 6/27/19  3. Pt will improve B hip strength to 5/5 to improve gait stability on unlevel surfaces.   4. Pt will improve FOTO score to at least 60/100 as a functional indicator of improved mobility.         PLAN  []  Upgrade activities as tolerated     [x]  Continue plan of care  []  Update interventions per flow sheet       []  Discharge due to:_  [x]  Other: reassess for PN next session    Shantell Mckenna DPT, 6/27/2019  635  PM

## 2019-07-01 ENCOUNTER — HOSPITAL ENCOUNTER (OUTPATIENT)
Dept: PHYSICAL THERAPY | Age: 74
Discharge: HOME | End: 2019-07-01
Payer: MEDICARE

## 2019-07-01 PROCEDURE — 97140 MANUAL THERAPY 1/> REGIONS: CPT

## 2019-07-01 PROCEDURE — 97110 THERAPEUTIC EXERCISES: CPT

## 2019-07-01 NOTE — PROGRESS NOTES
[address] [facility] AT [facility]  [address] [place] [address],[address], [place], [place]  Phone [phone]  Fax [phone]  PROGRESS NOTE  Patient Name: Puma Robles Sr. : 1945   Treatment/Medical Diagnosis: Right knee pain [M25.561]   Referral Source: [place], [place]     Date of Initial Visit: 6/10/19 Attended Visits: 10 Missed Visits: 0     SUMMARY OF TREATMENT  Patient has received physical therapy for R knee pain s/p R TKA 19 since 6/10/19. Treatment has included therapeutic stretching, strengthen, activities, manual/massage and modalities as need to assist with decreasing pain and increasing function. CURRENT STATUS  Patient has completed 10 visits  FOTO (Functional Status Summary)  score is 49/100 was 50/100 initial evaluation - indication of overall functional improvement. AROM currently is [phone] was 0-122 on initial evaluation. Pt is able to ascend stairs independently using reciprocal pattern; pt requires B UE support to descend stairs using reciprocal pattern   Patient's remaining chief c/o is swelling and pain in anterior and posterior knee  Edema:              Mid Patella: R 42.2 cm, L 39.5cm              Superior Border of Patella: 43.7 cm, L 40 cm              Inferior Border of Patella:  38 cm, L 37.7 cm    New Goals to be achieved in 4 weeks:  Long-term Goals: 4 weeks  1. Pt will improve R knee strength 5/5 to improve gait mechanics. Progress Note (19): Progressing, edema impairing quad strength      Current:     2. Pt will improve eccentric quad strength as noted with ability to perform 6\" step down. Progress Note (): Progressing, pt can perform step down, however with increased repetitions, uses B UE for support due to increased fatigue, weakness, and pain. Current:     3. Pt will improve B hip strength to 5/5 to improve gait stability on unlevel surface. Progress Note (19):       Current:    4.  Pt will improve FOTO score to at least 60/100 as a functional indicator of improved mobility. Progress Note (7/1/19): 49/100      Current:    5. Pt will demonstrate decreased edema such that the R knee circumference equals the left to improve quad contraction and eccentric control required to independently ascend/descend 1 flight of stairs (10 steps) Independently with reciprocal pattern. Progress Note (7/1/19): Mid Patella: R 42.2 cm, L 39.5cm              Superior Border of Patella: 43.7 cm, L 40 cm              Inferior Border of Patella:  38 cm, L 37.7 cm      Current:    RECOMMENDATIONS  Continue with above POC for 2 to 3 times a week for 4 weeks to achieve above goals    If you have any questions/comments please contact us directly at [phone]. Thank you for allowing us to assist in the care of your patient. Therapist Signature: Rekha English Date: 7/1/2019     Time: 7:14 PM   NOTE TO PHYSICIAN:  PLEASE COMPLETE THE ORDERS BELOW AND FAX TO   [facility]: [phone]. If you are unable to process this request in 24 hours please contact our office: [phone].    ___ I have read the above report and request that my patient continue as recommended.   ___ I have read the above report and request that my patient continue therapy with the following changes/special instructions:_________________________________________________________   ___ I have read the above report and request that my patient be discharged from therapy.      Physician Signature:        Date:       Time:

## 2019-07-01 NOTE — PROGRESS NOTES
PT DAILY TREATMENT NOTE - [facility] 3-16    Patient Name: Cash Hartley  Date:2019  : 1945  [x]  Patient  Verified  Payor: [place] MED[place]ARE / Plan: [place] MEDICARE PART A & B / Product Type: Medicare /    In time:   Out time: 5781  Total Treatment Time (min): 65  Total Timed Codes (min):   1:1 Treatment Time ( [address] only): 38  Visit #: 10 of 10    Treatment Area: Right knee pain [M25.561]    SUBJECTIVE  Pain Level (0-10 scale):3/10  Any medication changes, allergies to medications, adverse drug reactions, diagnosis change, or new procedure performed?: [x] No    [] Yes (see summary sheet for update)  Subjective functional status/changes:   [x] No changes reported  Pt c/o swelling in R knee    OBJECTIVE     Modality rationale: decrease edema, decrease inflammation and decrease pain to improve the patients ability to improve post session sorneess    Min Type Additional Details   DC [x] Estim:  [x]Unatt       [x]IFC  []Premod                        []Other:  [x]w/ice   []w/heat  Position: long sitting on plinth with TR under right knee  Location: R knee     [] Estim: []Att    []TENS instruct  []NMES                    []Other:  []w/US   []w/ice   []w/heat  Position:  Location:    []  Traction: [] Cervical       []Lumbar                       [] Prone          []Supine                       []Intermittent   []Continuous Lbs:  [] before manual  [] after manual    []  Ultrasound: []Continuous   [] Pulsed                           []1MHz   []3MHz Location:  W/cm2:    []  Iontophoresis with dexamethasone         Location: [] Take home patch   [] In clinic    [x]  Ice     []  heat  []  Ice massage  []  Laser   []  Anodyne Position:   Location:     []  Laser with stim  []  Other: Position:  Location:    []  Vasopneumatic Device Pressure:       [] lo [] med [] hi   Temperature: [] lo [] med [] hi   [x] Skin assessment post-treatment:  [x]intact []redness- no adverse reaction    []redness - adverse reaction:     42/15 min Therapeutic Exercise:  [x]  See flow sheet :   Rationale: increase ROM and increase strength to improve the patients ability to perform functional ADL's.    23 min Manual Therapy:  [x] See flow sheet : K-tape for edema   Rationale: increase ROM and increase strength to improve the patients ability to improve gait mechanics        With   [] TE   [] TA   [] neuro   [] other: Patient Education: [] Review HEP    [] Progressed/Changed HEP based on:   [] positioning   [] body mechanics   [] transfers   [] heat/ice application    [] Graston Education: Explained the effects and benefits of Graston Technique therapy including potential for post treatment soreness and bruising. [x] Other:      Other Objective/Functional Measures:   Edema:   Mid Patella: R 42.2 cm, L 39.5cm   Superior Border of Patella: 43.7 cm, L 40 cm   Inferior Border of Patella:  38 cm, L 37.7 cm  Stairs: Ascends stairs I with reciprocal pattern   Descends stairs Mod I with reciprocal pattern and Bilat UE for support    Pain Level (0-10 scale) post treatment: 0/10    ASSESSMENT/Changes in Function:   Continues to have swelling, however strength is progressing. Pt continues to demonstrate decreased eccentric quad control and VMO activation. Continue to progress strengthening as tolerated. Patient will continue to benefit from skilled PT services to modify and progress therapeutic interventions, address functional mobility deficits, address ROM deficits, address strength deficits, analyze and address soft tissue restrictions and address imbalance/dizziness to attain remaining goals. Progress towards goals / Updated goals: · Short Term Goals: To be accomplished in  2  weeks:  1. Pt will be IND and compliant with HEP for self-management of symptoms. initiated this goals with giving patient HEP 6/13/19   · Long Term Goals: To be accomplished in  10  weeks:  1. Pt will improve R knee strength 5/5 to improve gait mechanics. 7/1/19 Progressing  2. Pt will improve eccentric quad strength as noted with ability to perform 6\" step down. 7/1/19 Progressing, pt can perform step down, however with increased repetitions, uses B UE for support due to increased fatigue, weakness, and pain. 3. Pt will improve B hip strength to 5/5 to improve gait stability on unlevel surfaces.   4. Pt will improve FOTO score to at least 60/100 as a functional indicator of improved mobility. 7/1/19 Progressing 49/100        PLAN  []  Upgrade activities as tolerated     []  Continue plan of care  []  Update interventions per flow sheet       []  Discharge due to:_  [x]  Other:  See progress note    Angelica Merrill, PT, DPT  7/1/2019  1912 PM

## 2019-07-02 ENCOUNTER — APPOINTMENT (OUTPATIENT)
Dept: PHYSICAL THERAPY | Age: 74
End: 2019-07-02
Payer: MEDICARE

## 2019-07-03 ENCOUNTER — HOSPITAL ENCOUNTER (OUTPATIENT)
Dept: PHYSICAL THERAPY | Age: 74
Discharge: HOME | End: 2019-07-03
Payer: MEDICARE

## 2019-07-03 PROCEDURE — 97110 THERAPEUTIC EXERCISES: CPT | Performed by: PHYSICAL THERAPIST

## 2019-07-03 NOTE — PROGRESS NOTES
PT DAILY TREATMENT NOTE - [facility] 3-16    Patient Name: Sonya Maddox  Date:7/3/2019  : 1945  [x]  Patient  Verified  Payor: Khai James / Plan: [place] MEDICARE PART A & B / Product Type: Medicare /    In time: 350  Out time: 431  Total Treatment Time (min): 41  Total Timed Codes (min): 41  1:1 Treatment Time ( only): 41  Visit #: 1 of     Treatment Area: Right knee pain [M25.561]    SUBJECTIVE  Pain Level (0-10 scale):3/10  Any medication changes, allergies to medications, adverse drug reactions, diagnosis change, or new procedure performed?: [x] No    [] Yes (see summary sheet for update)  Subjective functional status/changes:   [x] No changes reported  Pt notes that the swelling still has not  Gone down.      OBJECTIVE     Modality rationale: decrease edema, decrease inflammation and decrease pain to improve the patients ability to improve post session sorneess    Min Type Additional Details   DC [x] Estim:  [x]Unatt       [x]IFC  []Premod                        []Other:  [x]w/ice   []w/heat  Position: long sitting on plinth with TR under right knee  Location: R knee     [] Estim: []Att    []TENS instruct  []NMES                    []Other:  []w/US   []w/ice   []w/heat  Position:  Location:    []  Traction: [] Cervical       []Lumbar                       [] Prone          []Supine                       []Intermittent   []Continuous Lbs:  [] before manual  [] after manual    []  Ultrasound: []Continuous   [] Pulsed                           []1MHz   []3MHz Location:  W/cm2:    []  Iontophoresis with dexamethasone         Location: [] Take home patch   [] In clinic    [x]  Ice     []  heat  []  Ice massage  []  Laser   []  Anodyne Position:   Location:     []  Laser with stim  []  Other: Position:  Location:    []  Vasopneumatic Device Pressure:       [] lo [] med [] hi   Temperature: [] lo [] med [] hi   [x] Skin assessment post-treatment:  [x]intact []redness- no adverse reaction    []redness - adverse reaction:     41 min Therapeutic Exercise:  [x]  See flow sheet :   Rationale: increase ROM and increase strength to improve the patients ability to perform functional ADL's. With   [] TE   [] TA   [] neuro   [] other: Patient Education: [] Review HEP    [] Progressed/Changed HEP based on:   [] positioning   [] body mechanics   [] transfers   [] heat/ice application    [] Graston Education: Explained the effects and benefits of Graston Technique therapy including potential for post treatment soreness and bruising. [x] Other:      Other Objective/Functional Measures:   Pt unable to perform hip extension in prone secondary to increased low back pain. Pain Level (0-10 scale) post treatment: 0/10    ASSESSMENT/Changes in Function:   Limited eccentric strength secondary to increased swelling of the R knee. Continue to progress strengthening as tolerated. Patient will continue to benefit from skilled PT services to modify and progress therapeutic interventions, address functional mobility deficits, address ROM deficits, address strength deficits, analyze and address soft tissue restrictions and address imbalance/dizziness to attain remaining goals. Progress towards goals / Updated goals:  Long-term Goals: 4 weeks  1. Pt will improve R knee strength 5/5 to improve gait mechanics. Progress Note (7/1/19): Progressing, edema impairing quad strength      Current:     2. Pt will improve eccentric quad strength as noted with ability to perform 6\" step down. Progress Note (71/1/19): Progressing, pt can perform step down, however with increased repetitions, uses B UE for support due to increased fatigue, weakness, and pain. Current:     3. Pt will improve B hip strength to 5/5 to improve gait stability on unlevel surface. Progress Note (7/1/19):       Current:     4. Pt will improve FOTO score to at least 60/100 as a functional indicator of improved mobility.      Progress Note (7/1/19): 49/100      Current:     5. Pt will demonstrate decreased edema such that the R knee circumference equals the left to improve quad contraction and eccentric control required to independently ascend/descend 1 flight of stairs (10 steps) Independently with reciprocal pattern. Progress Note (7/1/19):                Mid Patella: R 42.2 cm, L 39.5cm              Superior Border of Patella: 43.7 cm, L 40 cm              Inferior Border of Patella:  38 cm, L 37.7 cm      Current:      PLAN  []  Upgrade activities as tolerated     [x]  Continue plan of care  []  Update interventions per flow sheet       []  Discharge due to:_  [x]  Other Check goals     Chadwick Stone DPT,   7/3/2019  455  PM

## 2019-07-05 ENCOUNTER — APPOINTMENT (OUTPATIENT)
Dept: PHYSICAL THERAPY | Age: 74
End: 2019-07-05
Payer: MEDICARE

## 2019-07-05 ENCOUNTER — HOSPITAL ENCOUNTER (OUTPATIENT)
Dept: PHYSICAL THERAPY | Age: 74
Discharge: HOME | End: 2019-07-05
Payer: MEDICARE

## 2019-07-05 PROCEDURE — 97110 THERAPEUTIC EXERCISES: CPT

## 2019-07-05 NOTE — PROGRESS NOTES
PT DAILY TREATMENT NOTE - [facility]    Patient Name: Elvis Richards  Date:2019  : 1945  [x]  Patient  Verified  Payor: [place] MEDICARE / Plan: VA MEDICARE PART A & B / Product Type: Medicare /    In time: 2:18  Out time: 3:20  Total Treatment Time (min): 62  Total Timed Codes (min): 62  1:1 Treatment Time ( only): 40  Visit #: 2 of     Treatment Area: Right knee pain [M25.561]    SUBJECTIVE  Pain Level (0-10 scale):310  Any medication changes, allergies to medications, adverse drug reactions, diagnosis change, or new procedure performed?: [x] No    [] Yes (see summary sheet for update)  Subjective functional status/changes:   [x] No changes reported  I am worried about the swelling and when will it go away    OBJECTIVE     Modality rationale: decrease edema, decrease inflammation and decrease pain to improve the patients ability to improve post session sorneess    Min Type Additional Details   DC [x] Estim:  [x]Unatt       [x]IFC  []Premod                        []Other:  [x]w/ice   []w/heat  Position: long sitting on plinth with TR under right knee  Location: R knee     [] Estim: []Att    []TENS instruct  []NMES                    []Other:  []w/US   []w/ice   []w/heat  Position:  Location:    []  Traction: [] Cervical       []Lumbar                       [] Prone          []Supine                       []Intermittent   []Continuous Lbs:  [] before manual  [] after manual    []  Ultrasound: []Continuous   [] Pulsed                           []1MHz   []3MHz Location:  W/cm2:    []  Iontophoresis with dexamethasone         Location: [] Take home patch   [] In clinic    [x]  Ice     []  heat  []  Ice massage  []  Laser   []  Anodyne Position:   Location:     []  Laser with stim  []  Other: Position:  Location:    []  Vasopneumatic Device Pressure:       [] lo [] med [] hi   Temperature: [] lo [] med [] hi   [x] Skin assessment post-treatment:  [x]intact []redness- no adverse reaction    []redness - adverse reaction:     52 min Therapeutic Exercise:  [x]  See flow sheet :   Rationale: increase ROM and increase strength to improve the patients ability to perform functional ADL's. With   [] TE   [] TA   [] neuro   [x] other: Patient Education: [] Review HEP    [] Progressed/Changed HEP based on:   [] positioning   [] body mechanics   [] transfers   [] heat/ice application    [] Graston Education: Explained the effects and benefits of Graston Technique therapy including potential for post treatment soreness and bruising. [x] Other: 10 min education regarding the surgical procedure and reviewed the TKR and use of pictures to help patient understand his surgery      Other Objective/Functional Measures:   Decrease edema to mid patella from 42.2cm to 39cm  After explaining the TKR procedure patient understood a little bit more and the importance of slowing down to allow healing and to decrease edema to the knee joint       Pain Level (0-10 scale) post treatment: 2/10    ASSESSMENT/Changes in Function:   Limited eccentric strength secondary to increased swelling of the R knee. Continue to progress strengthening as tolerated. Patient will continue to benefit from skilled PT services to modify and progress therapeutic interventions, address functional mobility deficits, address ROM deficits, address strength deficits, analyze and address soft tissue restrictions and address imbalance/dizziness to attain remaining goals. Progress towards goals / Updated goals:  Long-term Goals: 4 weeks  1. Pt will improve R knee strength 5/5 to improve gait mechanics. Progress Note (7/1/19): Progressing, edema impairing quad strength      Current:     2. Pt will improve eccentric quad strength as noted with ability to perform 6\" step down. Progress Note (71/1/19): Progressing, pt can perform step down, however with increased repetitions, uses B UE for support due to increased fatigue, weakness, and pain. Current:     3. Pt will improve B hip strength to 5/5 to improve gait stability on unlevel surface. Progress Note (7/1/19):       Current:     4. Pt will improve FOTO score to at least 60/100 as a functional indicator of improved mobility. Progress Note (7/1/19): 49/100      Current:     5. Pt will demonstrate decreased edema such that the R knee circumference equals the left to improve quad contraction and eccentric control required to independently ascend/descend 1 flight of stairs (10 steps) Independently with reciprocal pattern. Progress Note (7/1/19):                Mid Patella: R 42.2 cm, L 39.5cm              Superior Border of Patella: 43.7 cm, L 40 cm              Inferior Border of Patella:  38 cm, L 37.7 cm      Current:      PLAN  [x]  Upgrade activities as tolerated     [x]  Continue plan of care  []  Update interventions per flow sheet       []  Discharge due to:_  []  Other     Fern Zhou, PTA,   7/5/2019  455  PM

## 2019-07-08 ENCOUNTER — APPOINTMENT (OUTPATIENT)
Dept: PHYSICAL THERAPY | Age: 74
End: 2019-07-08
Payer: MEDICARE

## 2019-07-09 ENCOUNTER — APPOINTMENT (OUTPATIENT)
Dept: PHYSICAL THERAPY | Age: 74
End: 2019-07-09
Payer: MEDICARE

## 2019-07-10 ENCOUNTER — APPOINTMENT (OUTPATIENT)
Dept: PHYSICAL THERAPY | Age: 74
End: 2019-07-10
Payer: MEDICARE

## 2019-07-11 ENCOUNTER — APPOINTMENT (OUTPATIENT)
Dept: PHYSICAL THERAPY | Age: 74
End: 2019-07-11
Payer: MEDICARE

## 2019-07-12 ENCOUNTER — APPOINTMENT (OUTPATIENT)
Dept: PHYSICAL THERAPY | Age: 74
End: 2019-07-12
Payer: MEDICARE

## 2019-07-22 ENCOUNTER — IMPORTED ENCOUNTER (OUTPATIENT)
Dept: URBAN - METROPOLITAN AREA CLINIC 1 | Facility: CLINIC | Age: 74
End: 2019-07-22

## 2019-07-22 PROBLEM — H43.811: Noted: 2019-07-22

## 2019-07-22 PROCEDURE — 99213 OFFICE O/P EST LOW 20 MIN: CPT

## 2019-07-22 NOTE — PATIENT DISCUSSION
1.  PVD w/o Tear OD - Patient was cautioned to call our office immediately if they experience   a substantial change in their symptoms such as an increase in floaters persistent flashes loss of visual field (may appear as a shadow or a curtain) or decrease in visual acuity as these may indicate a retinal tear or detachment. 2.  JAKE w/ PEK OU -- The continuation of artificial tears OU QID were recommended. Consider plugs if no improvement. 3.  Blepharitis OU -- Continue daily hot compresses and lid scrubs were recommended. 4. PCO -- Observe. 5. Pseudophakia OU -- Doing well.6. H/o Early Dry ARMD OD7. H/o DM Type II w/o DR / DME OU8. Return for an appointment for Return as scheduled with Dr. Ally Muller.

## 2019-07-22 NOTE — PATIENT DISCUSSION
2.  JAKE w/ PEK OU -- The continuation of artificial tears OU QID were recommended. Consider plugs if no improvement. 3.  Blepharitis OU -- Continue daily hot compresses and lid scrubs were recommended. 4. PCO -- Observe. 5. Pseudophakia OU -- Doing well.6. H/o Early Dry ARMD OD7.   H/o DM Type II w/o DR / DME OU

## 2019-08-13 PROBLEM — R07.2 PRECORDIAL CHEST PAIN: Status: ACTIVE | Noted: 2019-08-13

## 2019-08-15 PROBLEM — O22.30 DVT (DEEP VEIN THROMBOSIS) IN PREGNANCY: Status: ACTIVE | Noted: 2019-08-15

## 2019-08-30 ENCOUNTER — HOSPITAL ENCOUNTER (OUTPATIENT)
Dept: PHYSICAL THERAPY | Age: 74
Discharge: HOME | End: 2019-08-30
Payer: MEDICARE

## 2019-08-30 PROCEDURE — 97162 PT EVAL MOD COMPLEX 30 MIN: CPT

## 2019-08-30 PROCEDURE — 97110 THERAPEUTIC EXERCISES: CPT

## 2019-08-30 NOTE — PROGRESS NOTES
[address] [facility] AT [facility]  [address] [facility] [address] [facility] [address], [place], [place]  Phone [phone]  Fax [phone] / [address] [address] PHYSICAL THERAPY SERVICES  Patient Name: Christ Gallo : 1945   Medical   Diagnosis: Spinal stenosis, cervical region [M48.02]  Spinal stenosis, lumbar region without neurogenic claudication [M48.061] Treatment Diagnosis: Neck pain  Back pain   Onset Date: Back- Chronic. Neck ~ <1 months     Referral Source: [facility]): 2019   Prior Hospitalization: See medical history Provider #: [id number]   Prior Level of Function: Functionally I    Comorbidities: DM, HTN, Hx Right TKR. Medications: Verified on Patient Summary List   The Plan of Care and following information is based on the information from the initial evaluation.   =================================================================================  Assessment / key information:  74year old male presents for PT evaluation with diagnosis of Cervical and lumbar stenosis referred by his PCP. Patient reports that the back pain is chronic and neck pain is recent. Patient reports that current pain is 4/10 and is present along central cervical and Thoracic spine. Patient reports that he was hospitalized with his chief complaint being cervical and thoracic pain. Patient reports at that time the pain was along his central spine and radiated into shoulders but denies any numbness or tingling into BUEs. Inpatient testing was negative for infection, Moderate severe spinal stenosis in the C spine with no cord signal abnormalities with no stenosis in Thoracic or Lumbar spine. Patient is  and retired. Negative for red flags. FOTO= 57/100  Functional Limitations include decreased ability to tolerate prolonged standing or walking, decreased ability to lift, check blind spot or squat.    Clinical Exam:   Cervical spine AROM:   45* Flexion, 50* extension , 30* B lateral flexion w pain at [facility] right and B Cervical Rotation 60* w pain at [facility] on Right. LE ROM: Prone Internal hip rotation 20* Left, 35* Right, Right knee AROM Flexion 116*. Lumbar AROM Flexion 90% with pain at [facility], Extension 50% with pain at [facility] across lumbar spine. Strength: Right Shoulder 4-/5 Left shoulder 4/5. Patient is Right hand dominant. Bridge 50% with no c/o pain. Posture: Patient sits with significant forward head and rounded shoulder posture with B humerus sitting anteriorly. Noted increased Thoracic Kyphosis. Palpation: Patient is TTP along Left scalenes and SCM with increased palpable tension in B scalenes and SCM. Upper Traps and Levator scapulae do not present with significant trigger points. Patient is also TTP along thoracic spine paraspinals. Special tests: (+) SLR on Right at 45* for increased radicular symptoms into Right LE, (+) Obers on the Right for increased quad tension/ decreased quadriceps flexibility. Patient also presents with increased tension and decreased flexibility in B hamstrings.    Patient should benefit from an episode of skilled PT to address above functional limitations and clinical deficits to improve quality of life and return to OF.   =================================================================================  Eval Complexity: History: HIGH Complexity :3+ comorbidities / personal factors will impact the outcome/ POC Exam:HIGH Complexity : 4+ Standardized tests and measures addressing body structure, function, activity limitation and / or participation in recreation  Presentation: MEDIUM Complexity : Evolving with changing characteristics  Clinical Decision Making:MEDIUM Complexity : FOTO score of 26-74Overall Complexity:MEDIUM  Problem List: pain affecting function, decrease ROM, decrease strength, impaired gait/ balance, decrease ADL/ functional abilitiies, decrease activity tolerance, decrease flexibility/ joint mobility and decrease transfer abilities   Treatment Plan may include any combination of the following: Therapeutic exercise, Therapeutic activities, Neuromuscular re-education, Physical agent/modality, Gait/balance training, Manual therapy, Patient education, Self Care training, Functional mobility training, Home safety training and Stair training  Patient / Family readiness to learn indicated by: asking questions and interest  Persons(s) to be included in education: patient (P)  Barriers to Learning/Limitations: None  Measures taken:    Patient Goal (s): Get better or well   Patient self reported health status: good  Rehabilitation Potential: good  Short Term Goals: To be accomplished in  3  treatments:  1. Patient will demonstrate I and compliance with HEP to increase cervical ROM and increase scapular strength to demonstrate active role in rehab process. Long Term Goals: To be accomplished in  10  treatments:  1. Patient will increase AROM to Cervical spine to WNL with B side bending and rotation without an increase in pain to allow for increased ease for driving. 2. Patient will increase B scapular strength to 5/5 to allow for decreased pain with prolonged sitting and allow for proper postural alignment. 3. Patient will increase FOTO score to at least 63/100 to indicate improved I with functional mobility. 4. Patient will report decreased pain levels to 2/10 during and after activity to allow for return to PLOF. 5. Patient will present with improved B hip strength to 5/5 including extension to allow for proper support along lumbar spine.      Frequency / Duration:   Patient to be seen  2-3  times per week for 10  treatments:  Patient / Caregiver education and instruction: self care, activity modification and exercises  G-Codes (GP): EMILY  Therapist Signature: Guido Doherty PT, DPT Date: 9/25/3114   Certification Period: 8/30-11/28/2019 Time: 1:59 PM ===========================================================================================  I certify that the above Physical Therapy Services are being furnished while the patient is under my care. I agree with the treatment plan and certify that this therapy is necessary. Physician Signature:        Date:       Time:     Please sign and return to [facility] or you may fax the signed copy to [phone]. Thank you.

## 2019-09-03 ENCOUNTER — HOSPITAL ENCOUNTER (OUTPATIENT)
Dept: PHYSICAL THERAPY | Age: 74
Discharge: HOME | End: 2019-09-03
Payer: MEDICARE

## 2019-09-03 PROCEDURE — 97140 MANUAL THERAPY 1/> REGIONS: CPT

## 2019-09-03 PROCEDURE — 97110 THERAPEUTIC EXERCISES: CPT

## 2019-09-03 NOTE — PROGRESS NOTES
PT DAILY TREATMENT NOTE/CERVICAL [id number]    Patient Name: Red Hensley Sr.  Date: 2019  : 1945  [x]  Patient  Verified  Payor: Dione Sanders / Plan: [place] MEDICARE PART A & B / Product Type: Medicare /    In time:145  Out time:215  Total Treatment Time (min): 30  Visit #: 1 of 10    Medicare/BCBS Only   Total Timed Codes (min):  10 1:1 Treatment Time:  30     Treatment Area: Spinal stenosis, cervical region [M48.02]  Spinal stenosis, lumbar region without neurogenic claudication [M48.061]    SUBJECTIVE  Pain Level (0-10 scale): 4/10  []constant [x]intermittent [x]improving []worsening []no change since onset    Any medication changes, allergies to medications, adverse drug reactions, diagnosis change, or new procedure performed?: [x] No    [] Yes (see summary sheet for update)  Subjective functional status/changes:     74year old male presents for PT evaluation with diagnosis of Cervical and lumbar stenosis referred by his PCP. Patient reports that the back pain is chronic and neck pain is recent. Patient reports that current pain is 4/10 and is present along central cervical and Thoracic spine. Patient reports that he was hospitalized with his chief complaint being cervical and thoracic pain. Patient reports at that time the pain was along his central spine and radiated into shoulders but denies any numbness or tingling into BUEs. Inpatient testing was negative for infection, Moderate severe spinal stenosis in the C spine with no cord signal abnormalities with no stenosis in Thoracic or Lumbar spine. Patient is  and retired. Negative for red flags.  FOTO= 57/100    OBJECTIVE/EXAMINATION  20 min [x]Eval                  []Re-Eval       10 min Therapeutic Exercise:  [x] See flow sheet : Initiated HEP for C spine   Rationale: increase ROM and increase strength to improve the patients ability to to tolerate ADLs without an increase in pain           With   [] TE   [] TA   [] neuro   [] other: Patient Education: [x] Review HEP    [] Progressed/Changed HEP based on:   [] positioning   [] body mechanics   [] transfers   [] heat/ice application    [] other:    *    Physical Therapy Evaluation Cervical Spine   Clinical Exam:   Cervical spine AROM:   45* Flexion, 50* extension , 30* B lateral flexion w pain at [facility] right and B Cervical Rotation 60* w pain at [facility] on Right. LE ROM: Prone Internal hip rotation 20* Left, 35* Right, Right knee AROM Flexion 116*. Lumbar AROM Flexion 90% with pain at [facility], Extension 50% with pain at [facility] across lumbar spine. Strength: Right Shoulder 4-/5 Left shoulder 4/5. Patient is Right hand dominant. Bridge 50% with no c/o pain. Posture: Patient sits with significant forward head and rounded shoulder posture with B humerus sitting anteriorly. Noted increased Thoracic Kyphosis. Palpation: Patient is TTP along Left scalenes and SCM with increased palpable tension in B scalenes and SCM. Upper Traps and Levator scapulae do not present with significant trigger points. Patient is also TTP along thoracic spine paraspinals. Special tests: (+) SLR on Right at 45* for increased radicular symptoms into Right LE, (+) Obers on the Right for increased quad tension/ decreased quadriceps flexibility. Patient also presents with increased tension and decreased flexibility in B hamstrings. Pain Level (0-10 scale) post treatment: 3/10    ASSESSMENT/Changes in Function: Patient should benefit from an episode of skilled PT to address above functional limitations and clinical deficits to improve quality of life and return to OF.       [x]  See Plan of Care  []  See progress note/recertification  []  See Discharge Summary         Progress towards goals / Updated goals:  See POC     PLAN  []  Upgrade activities as tolerated     []  Continue plan of care  []  Update interventions per flow sheet       []  Discharge due to:_  [x]  Other:_  Patient to be seen 2-3 times a week for 10 treatments.      Binh Mendoza, PT 8/30/2019 2:08 PM

## 2019-09-03 NOTE — PROGRESS NOTES
PT DAILY TREATMENT NOTE - [facility]     Patient Name: Marco Mendoza  Date:9/3/2019  : 1945  [x]  Patient  Verified  Payor: [place] MEDICARE / Plan: [place] MEDICARE PART A & B / Product Type: Medicare /    In time: 3:49  Out time:4:35  Total Treatment Time (min): 46  Total Timed Codes (min): 46  1:1 Treatment Time ( [address] only): 41  Visit #: 2 of 8-10    Treatment Area: Spinal stenosis, cervical region [M48.02]  Spinal stenosis, lumbar region without neurogenic claudication [M48.061]    SUBJECTIVE  Pain Level IN:(0-10 scale): 5/10  Pain Level OUT: (0-10 scale) post treatment: 3/10    Any medication changes, allergies to medications, adverse drug reactions, diagnosis change, or new procedure performed?: [x] No    [] Yes (see summary sheet for update)  Subjective functional status/changes:   [] No changes reported  It hurts when I turn my head to the right more so when I turn my head to the left     OBJECTIVE    Modalities Rationale:     decrease inflammation, decrease pain and increase tissue extensibility to improve patient's ability to turn head to left and right with activities such as driving    min [] Estim, type/location:                                      []  att     []  unatt     []  w/US     []  w/ice    []  w/heat    min []  Mechanical Traction: type/lbs                   []  pro   []  sup   []  int   []  cont    []  before manual    []  after manual    min []  Ultrasound, settings/location:      min []  Iontophoresis w/ dexamethasone, location:                                               []  take home patch       []  in clinic    min []  Ice     []  Heat    location/position:     min []  Vasopneumatic Device, press/temp:     min []  Other:    [] Skin assessment post-treatment (if applicable):    []  intact    []  redness- no adverse reaction     []redness - adverse reaction:        23/18 min Therapeutic Exercise:  [x] See flow sheet : first follow up visit since initial evaluation - initiated POC per flow sheet   5 min NC for warm up    Rationale: increase ROM and increase strength to improve the patients ability to move head in all directions to allow patient drive without pain       23 min Manual Therapy:  MET to C3 to C7-T1 junction and trigger point release to (R) UT and mid trap to improve cervical rotation    Rationale: decrease pain, increase ROM, increase tissue extensibility, decrease trigger points and increase postural awareness to cervical spine             With   [] TE   [] TA   [] neuro   [] other: Patient Education: [x] Review HEP    [] Progressed/Changed HEP based on:   [] positioning   [] body mechanics   [] transfers   [] heat/ice application    [] other:      Objective/Functional Measures with Therapist Assessment Noted with Response to Therapy Session[de-identified]   first follow up visit since initial evaluation - initiated POC per flow sheet   Performed MET to C3-C7-T1 to improve cervical rotation (L) and (R)  Trigger point to the UT and Mid trap to assist with motion as well   Patient did report feeling better and was able to perform (R) cervical rotation better after manual         ASSESSMENT/Changes in Function:     Patient will continue to benefit from skilled PT services to modify and progress therapeutic interventions, address functional mobility deficits, address ROM deficits, address strength deficits, analyze and address soft tissue restrictions and assess and modify postural abnormalities to attain remaining goals. [x]  See Plan of Care  []  See progress note/recertification  []  See Discharge Summary         Progress towards goals / Updated goals: · Short Term Goals: To be accomplished in  3  treatments:  · 1. Patient will demonstrate I and compliance with HEP to increase cervical ROM and increase scapular strength to demonstrate active role in rehab process.      · Long Term Goals: To be accomplished in  10  treatments:  · 1.  Patient will increase AROM to Cervical spine to WNL with B side bending and rotation without an increase in pain to allow for increased ease for driving. · 2. Patient will increase B scapular strength to 5/5 to allow for decreased pain with prolonged sitting and allow for proper postural alignment. · 3. Patient will increase FOTO score to at least 63/100 to indicate improved I with functional mobility. · 4. Patient will report decreased pain levels to 2/10 during and after activity to allow for return to PLOF.   · 5.  Patient will present with improved B hip strength to 5/5 including extension to allow for proper support along lumbar spine.        PLAN  [x]  Upgrade activities as tolerated     [x]  Continue plan of care  []  Update interventions per flow sheet       []  Discharge due to:_  []  Other:_      Fany [facility] 9/3/2019  3:58 PM    Future Appointments   Date Time Provider Rosalia Lay   9/9/2019  4:00 PM [facility], PTA [facility]   9/11/2019  2:15 PM [facility] [place] [facility]   9/16/2019  2:30 PM [facility], PTA [facility]   9/18/2019  2:15 PM Cedric Gottlieb, PT [facility]   9/23/2019  4:15 PM [facility], PTA [facility]   9/26/2019  3:30 PM [facility], PTA [facility]   9/30/2019  2:45 PM STEPHEN HolguinT [facility]   10/3/2019  2:45 PM Cedric Gottlieb, PT [facility]   10/7/2019  2:45 PM [facility], PTA [facility]   10/10/2019  2:45 PM Cedric Gottlieb, PT [facility]   7/28/2020 11:15 AM Zeeshan Larios MD [id number]

## 2019-09-09 ENCOUNTER — HOSPITAL ENCOUNTER (OUTPATIENT)
Dept: PHYSICAL THERAPY | Age: 74
Discharge: HOME | End: 2019-09-09
Payer: MEDICARE

## 2019-09-09 PROCEDURE — 97110 THERAPEUTIC EXERCISES: CPT

## 2019-09-09 PROCEDURE — 97140 MANUAL THERAPY 1/> REGIONS: CPT

## 2019-09-09 NOTE — PROGRESS NOTES
PT DAILY TREATMENT NOTE - [facility]     Patient Name: Peter Finney  Date:2019  : 1945  [x]  Patient  Verified  Payor: Yg Strickland / Plan: [place] MEDICARE PART A & B / Product Type: Medicare /    In time: 4:00  Out time:5:00  Total Treatment Time (min): 60  Total Timed Codes (min): 45  1:1 Treatment Time ( [address] only): 35  Visit #: 3 of 8-10    Treatment Area: Spinal stenosis, cervical region [M48.02]  Spinal stenosis, lumbar region without neurogenic claudication [M48.061]    SUBJECTIVE  Pain Level IN:(0-10 scale): 5/10  Pain Level OUT: (0-10 scale) post treatment: 2/10    Any medication changes, allergies to medications, adverse drug reactions, diagnosis change, or new procedure performed?: [x] No    [] Yes (see summary sheet for update)  Subjective functional status/changes:   [] No changes reported  I did feel alittle bit better after the last time I was here      OBJECTIVE    Modalities Rationale:     decrease inflammation, decrease pain and increase tissue extensibility to improve patient's ability to turn head to left and right with activities such as driving    min [] Estim, type/location:                                      []  att     []  unatt     []  w/US     []  w/ice    []  w/heat    min []  Mechanical Traction: type/lbs                   []  pro   []  sup   []  int   []  cont    []  before manual    []  after manual    min []  Ultrasound, settings/location:      min []  Iontophoresis w/ dexamethasone, location:                                               []  take home patch       []  in clinic   15 min []  Ice     [x]  Heat    location/position:  To cervical and scapula area    min []  Vasopneumatic Device, press/temp:     min []  Other:    [] Skin assessment post-treatment (if applicable):    []  intact    []  redness- no adverse reaction     []redness - adverse reaction:        22/17 min Therapeutic Exercise:  [x] See flow sheet :    5 min NC for warm up    Rationale: increase ROM and The blood counts, liver, kidney, urine and CRP/TSH inflammation labs are normal.     Alejandro SHAH, CNP, MSN  12/5/2018  2:29 PM       increase strength to improve the patients ability to move head in all directions to allow patient drive without pain       23 min Manual Therapy:  MET to C3 to C7-T1 junction and trigger point release to (R) UT and mid trap to improve cervical rotation    Rationale: decrease pain, increase ROM, increase tissue extensibility, decrease trigger points and increase postural awareness to cervical spine             With   [] TE   [] TA   [] neuro   [] other: Patient Education: [x] Review HEP    [] Progressed/Changed HEP based on:   [] positioning   [] body mechanics   [] transfers   [] heat/ice application    [] other:      Objective/Functional Measures with Therapist Assessment Noted with Response to Therapy Session:  Increase active and passive ROM with (R) cervical rotation with less tightness noted to the (R) SCM/scaelnus    GOALS -ltg#1  · 1. Patient will increase AROM to Cervical spine to WNL with B side bending and rotation without an increase in pain to allow for increased ease for driving. PROGRESSING 9/9/2019      ASSESSMENT/Changes in Function:     Patient will continue to benefit from skilled PT services to modify and progress therapeutic interventions, address functional mobility deficits, address ROM deficits, address strength deficits, analyze and address soft tissue restrictions and assess and modify postural abnormalities to attain remaining goals. [x]  See Plan of Care  []  See progress note/recertification  []  See Discharge Summary         Progress towards goals / Updated goals: · Short Term Goals: To be accomplished in  3  treatments:  · 1. Patient will demonstrate I and compliance with HEP to increase cervical ROM and increase scapular strength to demonstrate active role in rehab process.      · Long Term Goals: To be accomplished in  10  treatments:  · 1.  Patient will increase AROM to Cervical spine to WNL with B side bending and rotation without an increase in pain to allow for increased ease for driving. PROGRESSING 9/9/2019  · 2. Patient will increase B scapular strength to 5/5 to allow for decreased pain with prolonged sitting and allow for proper postural alignment. · 3. Patient will increase FOTO score to at least 63/100 to indicate improved I with functional mobility. · 4. Patient will report decreased pain levels to 2/10 during and after activity to allow for return to PLOF.   · 5.  Patient will present with improved B hip strength to 5/5 including extension to allow for proper support along lumbar spine.        PLAN  [x]  Upgrade activities as tolerated     [x]  Continue plan of care  []  Update interventions per flow sheet       []  Discharge due to:_  []  Other:_      Maninder Nurse, [facility] 9/9/2019  3:58 PM    Future Appointments   Date Time Provider Rosalia Lay   9/11/2019  2:15 PM [facility] PT [place] [facility]   9/16/2019  2:30 PM Lorena Beltran PTA [facility]   9/18/2019  2:15 PM Irma Vargas, PT [facility]   9/23/2019  4:15 PM Lorena Beltran PTA [facility]   9/26/2019  3:30 PM Lorena Beltran PTA [facility]   9/30/2019  2:45 PM [address] [facility]   10/3/2019  2:45 PM Irma Vargas, PT [facility]   10/7/2019  2:45 PM Lorena Beltran PTA [facility]   10/10/2019  2:45 PM Irma Vargas, PT [facility]   7/28/2020 11:15 AM Aj Toledo MD [address] [facility]

## 2019-09-11 ENCOUNTER — HOSPITAL ENCOUNTER (OUTPATIENT)
Dept: PHYSICAL THERAPY | Age: 74
Discharge: HOME | End: 2019-09-11
Payer: MEDICARE

## 2019-09-11 PROCEDURE — 97110 THERAPEUTIC EXERCISES: CPT

## 2019-09-11 PROCEDURE — 97140 MANUAL THERAPY 1/> REGIONS: CPT

## 2019-09-11 NOTE — PROGRESS NOTES
PT DAILY TREATMENT NOTE - [facility]     Patient Name: Feng Torres.  Date:2019  : 1945  [x]  Patient  Verified  Payor: Diamond Mcmahanlaurie / Plan: [place] MEDICARE PART A & B / Product Type: Medicare /    In time: 2:20  Out time: 3:18  Total Treatment Time (min):  58  Total Timed Codes (min): 48  1:1 Treatment Time ( [address] only): 37  Visit #: 4 of 8-10    Treatment Area: Spinal stenosis, cervical region [M48.02]  Spinal stenosis, lumbar region without neurogenic claudication [M48.061]    SUBJECTIVE  Pain Level IN:(0-10 scale): 4-5/10 right c/s and shoulder  Pain Level OUT: (0-10 scale) post treatment: 3/10    Any medication changes, allergies to medications, adverse drug reactions, diagnosis change, or new procedure performed?: [x] No    [] Yes (see summary sheet for update)  Subjective functional status/changes:   [x] No changes reported  It's about the same. OBJECTIVE    Modalities Rationale:     decrease inflammation, decrease pain and increase tissue extensibility to improve patient's ability to turn head to left and right with activities such as driving    min [] Estim, type/location:                                      []  att     []  unatt     []  w/US     []  w/ice    []  w/heat    min []  Mechanical Traction: type/lbs                   []  pro   []  sup   []  int   []  cont    []  before manual    []  after manual    min []  Ultrasound, settings/location:      min []  Iontophoresis w/ dexamethasone, location:                                               []  take home patch       []  in clinic   10 min []  Ice     [x]  Heat    location/position:  To cervical and scapula area    min []  Vasopneumatic Device, press/temp:     min []  Other:    [x] Skin assessment post-treatment (if applicable):    [x]  intact    []  redness- no adverse reaction     []redness - adverse reaction:        38/33 min Therapeutic Exercise:  [x] See flow sheet :  5 min NC for warm up    Rationale: increase ROM and increase strength to improve the patients ability to move head in all directions to allow patient drive without pain       10 min Manual Therapy:  STM/DTM to (R) c/s paraspinals, trigger point release to (R) UT and mid trap to improve cervical rotation - in seated position   Rationale: decrease pain, increase ROM, increase tissue extensibility, decrease trigger points and increase postural awareness to cervical spine             With   [] TE   [] TA   [] neuro   [] other: Patient Education: [x] Review HEP    [] Progressed/Changed HEP based on:   [] positioning   [] body mechanics   [] transfers   [] heat/ice application    [] other:      Objective/Functional Measures with Therapist Assessment Noted with Response to Therapy Session:   - Tactile cuing for technique with cervical rotation with towel  - Initiated wall clocks, wall V's and supine scapular stabs to increase scapular stability and strength  - Initiated bridges with ball/band and SKTC S to address back pain    ASSESSMENT/Changes in Function:   Patient tolerated new TE well,requiring 100% cuing for form and technique. No increase in pain. Continues to have trigger points in right UT and MT. Patient will continue to benefit from skilled PT services to modify and progress therapeutic interventions, address functional mobility deficits, address ROM deficits, address strength deficits, analyze and address soft tissue restrictions and assess and modify postural abnormalities to attain remaining goals. [x]  See Plan of Care  []  See progress note/recertification  []  See Discharge Summary         Progress towards goals / Updated goals: · Short Term Goals: To be accomplished in  3  treatments:  · 1. Patient will demonstrate I and compliance with HEP to increase cervical ROM and increase scapular strength to demonstrate active role in rehab process.      · Long Term Goals: To be accomplished in  10  treatments:  · 1.  Patient will increase AROM to Cervical spine to WNL with B side bending and rotation without an increase in pain to allow for increased ease for driving. PROGRESSING 9/9/2019  · 2. Patient will increase B scapular strength to 5/5 to allow for decreased pain with prolonged sitting and allow for proper postural alignment. · 3. Patient will increase FOTO score to at least 63/100 to indicate improved I with functional mobility. · 4. Patient will report decreased pain levels to 2/10 during and after activity to allow for return to PLOF.   · 5.  Patient will present with improved B hip strength to 5/5 including extension to allow for proper support along lumbar spine.        PLAN  [x]  Upgrade activities as tolerated     [x]  Continue plan of care  []  Update interventions per flow sheet       []  Discharge due to:_  []  Other:_      NabilCHAIM Slade 9/11/2019  3:18 PM    Future Appointments   Date Time Provider Rosalia Lay   9/11/2019  2:15 PM [facility] [place] [facility]16/2019  2:45 PM [facility] PT [place] [facility]   9/18/2019  2:15 PM Sylvia Ray PT [facility]   9/23/2019  4:15 PM Lexx Arreguin PTA [facility]   9/26/2019  3:30 PM Lexx Arreguin PTA [facility]   9/30/2019  2:45 PM [address] [place] [address] 2 [facility]   10/3/2019  2:45 PM Sylvia Ray PT [facility]   10/7/2019  2:45 PM Lexx Arreguin PTA [facility]   10/10/2019  2:45 PM Sylvia Ray PT [facility]   7/28/2020 11:15 AM MD Maritza Calle

## 2019-09-16 ENCOUNTER — HOSPITAL ENCOUNTER (OUTPATIENT)
Dept: PHYSICAL THERAPY | Age: 74
Discharge: HOME | End: 2019-09-16
Payer: MEDICARE

## 2019-09-16 PROCEDURE — 97110 THERAPEUTIC EXERCISES: CPT

## 2019-09-16 NOTE — PROGRESS NOTES
PT DAILY TREATMENT NOTE - [facility]     Patient Name: Devi Stone.  Date:2019  : 1945  [x]  Patient  Verified  Payor: Kassidy Rico / Plan: [place] MED[place] & B / Product Type: Medicare /    In time: 164 Out time:    Total Treatment Time (min):   52  Total Timed Codes (min): 52  1:1 Treatment Time ( only): 46  Visit #: 5 of 8-10    Treatment Area: Spinal stenosis, cervical region [M48.02]  Spinal stenosis, lumbar region without neurogenic claudication [M48.061]    SUBJECTIVE  Pain Level IN:(0-10 scale): 4/10 neck, 6/10 low back  Pain Level OUT: (0-10 scale) post treatment: 3/10 neck, 4/10 low back    Any medication changes, allergies to medications, adverse drug reactions, diagnosis change, or new procedure performed?: [x] No    [] Yes (see summary sheet for update)  Subjective functional status/changes:   [x] No changes reported  Patient c/o R knee swelling and states that his MD is going to schedule a CT scan. OBJECTIVE    Modalities Rationale:     decrease inflammation, decrease pain and increase tissue extensibility to improve patient's ability to turn head to left and right with activities such as driving    min [] Estim, type/location:                                      []  att     []  unatt     []  w/US     []  w/ice    []  w/heat    min []  Mechanical Traction: type/lbs                   []  pro   []  sup   []  int   []  cont    []  before manual    []  after manual    min []  Ultrasound, settings/location:      min []  Iontophoresis w/ dexamethasone, location:                                               []  take home patch       []  in clinic   PD min []  Ice     [x]  Heat    location/position:  To cervical and scapula area    min []  Vasopneumatic Device, press/temp:     min []  Other:    [x] Skin assessment post-treatment (if applicable):    [x]  intact    []  redness- no adverse reaction     []redness - adverse reaction:        52/47 min Therapeutic Exercise:  [x] See flow sheet :  5 min NC for warm up    Rationale: increase ROM and increase strength to improve the patients ability to move head in all directions to allow patient drive without pain        min Manual Therapy:  STM/DTM to (R) c/s paraspinals, trigger point release to (R) UT and mid trap to improve cervical rotation - in seated position   Rationale: decrease pain, increase ROM, increase tissue extensibility, decrease trigger points and increase postural awareness to cervical spine             With   [x] TE   [] TA   [] neuro   [] other: Patient Education: [x] Review HEP    [] Progressed/Changed HEP based on:   [] positioning   [] body mechanics   [] transfers   [] heat/ice application    [] other:      Objective/Functional Measures with Therapist Assessment Noted with Response to Therapy Session:   - treatment focused more on LBP today  - Initiated LTRs and HS stretch    ASSESSMENT/Changes in Function:   Patient ambulates with an antalgic gait to the R due to knee pain and swelling which may contribute to patient's low back pain. Patient responded well to lumbar stretches and therex, resulting in decreased pain. Continue plan of care per patient's tolerance. Patient will continue to benefit from skilled PT services to modify and progress therapeutic interventions, address functional mobility deficits, address ROM deficits, address strength deficits, analyze and address soft tissue restrictions and assess and modify postural abnormalities to attain remaining goals. [x]  See Plan of Care  []  See progress note/recertification  []  See Discharge Summary         Progress towards goals / Updated goals: · Short Term Goals: To be accomplished in  3  treatments:  · 1. Patient will demonstrate I and compliance with HEP to increase cervical ROM and increase scapular strength to demonstrate active role in rehab process.      · Long Term Goals: To be accomplished in  10  treatments:  · 1.  Patient will increase AROM to Cervical spine to WNL with B side bending and rotation without an increase in pain to allow for increased ease for driving. PROGRESSING 9/9/2019  · 2. Patient will increase B scapular strength to 5/5 to allow for decreased pain with prolonged sitting and allow for proper postural alignment. · 3. Patient will increase FOTO score to at least 63/100 to indicate improved I with functional mobility. · 4. Patient will report decreased pain levels to 2/10 during and after activity to allow for return to PLOF.   · 5.  Patient will present with improved B hip strength to 5/5 including extension to allow for proper support along lumbar spine.        PLAN  [x]  Upgrade activities as tolerated     [x]  Continue plan of care  []  Update interventions per flow sheet       []  Discharge due to:_  []  Other:_      Jordi Reilly, PT, DPT   9/16/2019  1842 PM    Future Appointments   Date Time Provider Rosalia Rosanne   9/16/2019  2:45 PM [address] [place] [address] 2 [facility]   9/18/2019  2:15 PM Clayton Shearer, PT [facility]   9/23/2019  4:15 PM Thomas Hernandez, PTA [facility]   9/26/2019  3:30 PM Thomas Hernandez, PTA [facility]   9/30/2019  2:45 PM [address] [place] [address] 2 [facility]   10/3/2019  2:45 PM Clayton Shearer, PT [facility]   10/7/2019  2:45 PM Thomas Hernandez, PTA [facility]   10/10/2019  2:45 PM Clayton Shearer, PT [facility]   7/28/2020 11:15 AM Alessandra Watson MD [address]

## 2019-09-18 ENCOUNTER — HOSPITAL ENCOUNTER (OUTPATIENT)
Dept: PHYSICAL THERAPY | Age: 74
Discharge: HOME | End: 2019-09-18
Payer: MEDICARE

## 2019-09-18 PROCEDURE — 97140 MANUAL THERAPY 1/> REGIONS: CPT

## 2019-09-18 PROCEDURE — 97110 THERAPEUTIC EXERCISES: CPT

## 2019-09-18 NOTE — PROGRESS NOTES
PT DAILY TREATMENT NOTE - [facility]     Patient Name: Kat Medley.  Date:2019  : 1945  [x]  Patient  Verified  Payor: Christy Burden / Plan: [place] MEDICARE PART A & B / Product Type: Medicare /    In time: 9049 Out time: 151  Total Treatment Time (min):  38  Total Timed Codes (min): 38  1:1 Treatment Time ( only): 45  Visit #: 6 of 8-10    Treatment Area: Spinal stenosis, cervical region [M48.02]  Spinal stenosis, lumbar region without neurogenic claudication [M48.061]    SUBJECTIVE  Pain Level IN:(0-10 scale): 0/10 neck, 5-6/10 low back  Pain Level OUT: (0-10 scale) post treatment: o/10 neck, 3/10 low back    Any medication changes, allergies to medications, adverse drug reactions, diagnosis change, or new procedure performed?: [x] No    [] Yes (see summary sheet for update)  Subjective functional status/changes:   [x] No changes reported  Patient stated that the automatic call he received for his past 2 appointments has given the wrong time. Patient stated that he has a CT scan this Saturday for his knee. OBJECTIVE    Modalities Rationale:     decrease inflammation, decrease pain and increase tissue extensibility to improve patient's ability to turn head to left and right with activities such as driving    min [] Estim, type/location:                                      []  att     []  unatt     []  w/US     []  w/ice    []  w/heat    min []  Mechanical Traction: type/lbs                   []  pro   []  sup   []  int   []  cont    []  before manual    []  after manual    min []  Ultrasound, settings/location:      min []  Iontophoresis w/ dexamethasone, location:                                               []  take home patch       []  in clinic   PD min []  Ice     [x]  Heat    location/position:  To cervical and scapula area    min []  Vasopneumatic Device, press/temp:     min []  Other:    [x] Skin assessment post-treatment (if applicable):    [x]  intact    []  redness- no adverse reaction     []redness - adverse reaction:        28/23 min Therapeutic Exercise:  [x] See flow sheet :  5 min NC for warm up, updated HEP   Rationale: increase ROM and increase strength to improve the patients ability to move head in all directions to allow patient drive without pain       10 min Manual Therapy:  STM/DTM to (R) c/s paraspinals, trigger point release to (R) UT and mid trap to improve cervical rotation - in seated position   Rationale: decrease pain, increase ROM, increase tissue extensibility, decrease trigger points and increase postural awareness to cervical spine             With   [x] TE   [] TA   [] neuro   [] other: Patient Education: [x] Review HEP    [] Progressed/Changed HEP based on:   [] positioning   [] body mechanics   [] transfers   [] heat/ice application    [] other:      Objective/Functional Measures with Therapist Assessment Noted with Response to Therapy Session:   - treatment focused more on LBP today (2nd visit with priority of LBP)  -patient arrived 15 min late  - max cues for TA draw as prerequisite for Lat Walk Aways  -LTG #4: 1st visit with 0/10 neck pain    ASSESSMENT/Changes in Function:   Patient was late to appointment due to automated phone call appointment reminder, therefore treatment was abbreviated. Patient reported improved cervical pain, therefore treatment focused on low back pain. Patient presented with increased L side gluteal tension and bilateral lower thoracic, upper lumbar paraspinal tension. Patient was provided updated HEP with LTR, SKTC, HS stretching for low back pain. Continue plan of care per patient's tolerance. Provide updated HEP for cervical and lumbar strengthening.     Patient will continue to benefit from skilled PT services to modify and progress therapeutic interventions, address functional mobility deficits, address ROM deficits, address strength deficits, analyze and address soft tissue restrictions and assess and modify postural abnormalities to attain remaining goals. [x]  See Plan of Care  []  See progress note/recertification  []  See Discharge Summary         Progress towards goals / Updated goals: · Short Term Goals: To be accomplished in  3  treatments:  · 1. Patient will demonstrate I and compliance with HEP to increase cervical ROM and increase scapular strength to demonstrate active role in rehab process.      · Long Term Goals: To be accomplished in  10  treatments:  · 1. Patient will increase AROM to Cervical spine to WNL with B side bending and rotation without an increase in pain to allow for increased ease for driving. PROGRESSING 9/9/2019  · 2. Patient will increase B scapular strength to 5/5 to allow for decreased pain with prolonged sitting and allow for proper postural alignment. · 3. Patient will increase FOTO score to at least 63/100 to indicate improved I with functional mobility. · 4. Patient will report decreased pain levels to 2/10 during and after activity to allow for return to PLOF.   · 5.  Patient will present with improved B hip strength to 5/5 including extension to allow for proper support along lumbar spine.        PLAN  [x]  Upgrade activities as tolerated     [x]  Continue plan of care  []  Update interventions per flow sheet       []  Discharge due to:_  [x]  Other:_ NV provide updated HEP for neck and low back strengthening     Nedra Nathan, PT, DPT   9/18/2019  1551 PM    Future Appointments   Date Time Provider Rosalia Lay   9/18/2019  2:15 PM [facility] [place] [facility]   9/23/2019  4:15 PM Ty Chandler PTA [facility]   9/26/2019  3:30 PM Ty Chandler PTA [facility]   9/30/2019  2:45 PM [address] [facility]   10/3/2019  2:45 PM Manuela Morales PT [facility]   10/7/2019  2:45 PM Ty Chandler PTA [facility]   10/10/2019  2:45 PM Manuela Morales PT [facility]   7/28/2020 11:15 AM Nory Banda MD [address] [facility]

## 2019-09-23 ENCOUNTER — HOSPITAL ENCOUNTER (OUTPATIENT)
Dept: PHYSICAL THERAPY | Age: 74
Discharge: HOME | End: 2019-09-23
Payer: MEDICARE

## 2019-09-23 PROCEDURE — 97110 THERAPEUTIC EXERCISES: CPT

## 2019-09-23 PROCEDURE — 97140 MANUAL THERAPY 1/> REGIONS: CPT

## 2019-09-23 NOTE — PROGRESS NOTES
PT DAILY TREATMENT NOTE - [facility]     Patient Name: Elo Ramirez Sr.  Date:2019  : 1945  [x]  Patient  Verified  Payor: Omid Fuller / Plan: [place] MEDICARE PART A & B / Product Type: Medicare /    In time: 4:32 Out time: 5:35  Total Treatment Time (min):  63  Total Timed Codes (min): 48  1:1 Treatment Time ( [address] only): 37  Visit #: 7 of 8-10    Treatment Area: Spinal stenosis, cervical region [M48.02]  Spinal stenosis, lumbar region without neurogenic claudication [M48.061]    SUBJECTIVE  Pain Level IN:(0-10 scale):  5/10 back   Pain Level OUT: (0-10 scale) post treatment: 10 back     Any medication changes, allergies to medications, adverse drug reactions, diagnosis change, or new procedure performed?: [x] No    [] Yes (see summary sheet for update)  Subjective functional status/changes:   [x] No changes reported  My back is really hurting me today more than my neck because I was doing yard work     OBJECTIVE    Modalities Rationale:     decrease inflammation, decrease pain and increase tissue extensibility to improve patient's ability to turn head to left and right with activities such as driving    min [] Estim, type/location:                                      []  att     []  unatt     []  w/US     []  w/ice    []  w/heat    min []  Mechanical Traction: type/lbs                   []  pro   []  sup   []  int   []  cont    []  before manual    []  after manual    min []  Ultrasound, settings/location:      min []  Iontophoresis w/ dexamethasone, location:                                               []  take home patch       []  in clinic   15 min []  Ice     [x]  Heat    location/position:  To lumbar spine     min []  Vasopneumatic Device, press/temp:     min []  Other:    [x] Skin assessment post-treatment (if applicable):    [x]  intact    []  redness- no adverse reaction     []redness - adverse reaction:        33/28 min Therapeutic Exercise:  [x] See flow sheet :  5 min NC for warm up, Rationale: increase ROM and increase strength to improve the patients ability to move head in all directions to allow patient drive without pain       15 min Manual Therapy:  STM/DTM (R) QL and lumbar paraspinals in (L) side lying    Rationale: decrease pain, increase ROM, increase tissue extensibility, decrease trigger points and increase postural awareness to cervical spine             With   [x] TE   [] TA   [] neuro   [] other: Patient Education: [x] Review HEP    [] Progressed/Changed HEP based on:   [] positioning   [] body mechanics   [] transfers   [] heat/ice application    [] other:      Objective/Functional Measures with Therapist Assessment Noted with Response to Therapy Session:   Patient presents with a large trigger point to the (R) QL and responded well with manual release - tolerated all exercises well - continue with POC to achieve all remaining goals stated below       ASSESSMENT/Changes in Function:   Patient will continue to benefit from skilled PT services to modify and progress therapeutic interventions, address functional mobility deficits, address ROM deficits, address strength deficits, analyze and address soft tissue restrictions and assess and modify postural abnormalities to attain remaining goals. [x]  See Plan of Care  []  See progress note/recertification  []  See Discharge Summary         Progress towards goals / Updated goals: · Short Term Goals: To be accomplished in  3  treatments:  · 1. Patient will demonstrate I and compliance with HEP to increase cervical ROM and increase scapular strength to demonstrate active role in rehab process.      · Long Term Goals: To be accomplished in  10  treatments:  · 1. Patient will increase AROM to Cervical spine to WNL with B side bending and rotation without an increase in pain to allow for increased ease for driving. PROGRESSING 9/9/2019  · 2.  Patient will increase B scapular strength to 5/5 to allow for decreased pain with prolonged sitting and allow for proper postural alignment. · 3. Patient will increase FOTO score to at least 63/100 to indicate improved I with functional mobility. · 4. Patient will report decreased pain levels to 2/10 during and after activity to allow for return to PLOF.   · 5.  Patient will present with improved B hip strength to 5/5 including extension to allow for proper support along lumbar spine.        PLAN  [x]  Upgrade activities as tolerated     [x]  Continue plan of care  []  Update interventions per flow sheet       []  Discharge due to:_  [x]  Other:_ NV provide updated HEP for neck and low back strengthening     Ciro Ocasio PTA, PT, DPT   9/23/2019  1551 PM    Future Appointments   Date Time Provider Rosalia Lay   9/26/2019  3:30 PM Yaz Villalobos PTA [facility]   9/30/2019  2:45 PM [address] [facility]   10/3/2019  2:45 PM Destiny Arriaga PT [facility]   10/7/2019  2:45 PM Yaz Villalobos PTA [facility]   10/10/2019  2:45 PM Destiny Arriaga PT [facility]   7/28/2020 11:15 AM Esperanza Rodriguez MD [address] [facility]

## 2019-09-26 ENCOUNTER — HOSPITAL ENCOUNTER (OUTPATIENT)
Dept: PHYSICAL THERAPY | Age: 74
Discharge: HOME | End: 2019-09-26
Payer: MEDICARE

## 2019-09-26 PROCEDURE — 97110 THERAPEUTIC EXERCISES: CPT

## 2019-09-26 PROCEDURE — 97140 MANUAL THERAPY 1/> REGIONS: CPT

## 2019-09-26 NOTE — PROGRESS NOTES
PT DAILY TREATMENT NOTE - [facility]     Patient Name: Edy Albarran Sr.  Date:2019  : 1945  [x]  Patient  Verified  Payor: Zhao Garcia / Plan: [place] MEDICARE PART A & B / Product Type: Medicare /    In time: 3:30 Out time: 4:40  Total Treatment Time (min):  70  Total Timed Codes (min):55   1:1 Treatment Time ( [address] only): 39  Visit #: 8 of 8-10    Treatment Area: Spinal stenosis, cervical region [M48.02]  Spinal stenosis, lumbar region without neurogenic claudication [M48.061]    SUBJECTIVE  Pain Level IN:(0-10 scale):  510 back   Pain Level OUT: (0-10 scale) post treatment: 10    Any medication changes, allergies to medications, adverse drug reactions, diagnosis change, or new procedure performed?: [x] No    [] Yes (see summary sheet for update)  Subjective functional status/changes:   [x] No changes reported   My back is really bad today - I don't what I did to make it hurt    OBJECTIVE    Modalities Rationale:     decrease inflammation, decrease pain and increase tissue extensibility to improve patient's ability to turn head to left and right with activities such as driving    min [] Estim, type/location:                                      []  att     []  unatt     []  w/US     []  w/ice    []  w/heat    min []  Mechanical Traction: type/lbs                   []  pro   []  sup   []  int   []  cont    []  before manual    []  after manual    min []  Ultrasound, settings/location:      min []  Iontophoresis w/ dexamethasone, location:                                               []  take home patch       []  in clinic   10+5 set up min []  Ice     [x]  Heat    location/position:  To lumbar spine     min []  Vasopneumatic Device, press/temp:     min []  Other:    [x] Skin assessment post-treatment (if applicable):    [x]  intact    []  redness- no adverse reaction     []redness - adverse reaction:        40/35 min Therapeutic Exercise:  [x] See flow sheet :  5 min NC for warm up,    Rationale: increase ROM and increase strength to improve the patients ability to move head in all directions to allow patient drive without pain       15 min Manual Therapy:  STM/DTM (R) QL and lumbar paraspinals in (L) side lying    Rationale: decrease pain, increase ROM, increase tissue extensibility, decrease trigger points and increase postural awareness to cervical spine             With   [x] TE   [] TA   [] neuro   [] other: Patient Education: [x] Review HEP    [] Progressed/Changed HEP based on:   [] positioning   [] body mechanics   [] transfers   [] heat/ice application    [] other:      Objective/Functional Measures with Therapist Assessment Noted with Response to Therapy Session:   Patient report increase lumbar pain however with no known cause of provocation   Patient will be reassessed next visit  Patient continues to present with large (R) trigger point that continues to cause problems and increase discomfort to patient - unable to release for prolong period of time       ASSESSMENT/Changes in Function:   Patient will continue to benefit from skilled PT services to modify and progress therapeutic interventions, address functional mobility deficits, address ROM deficits, address strength deficits, analyze and address soft tissue restrictions and assess and modify postural abnormalities to attain remaining goals. [x]  See Plan of Care  []  See progress note/recertification  []  See Discharge Summary         Progress towards goals / Updated goals: · Short Term Goals: To be accomplished in  3  treatments:  · 1. Patient will demonstrate I and compliance with HEP to increase cervical ROM and increase scapular strength to demonstrate active role in rehab process.      · Long Term Goals: To be accomplished in  10  treatments:  · 1. Patient will increase AROM to Cervical spine to WNL with B side bending and rotation without an increase in pain to allow for increased ease for driving. PROGRESSING 9/9/2019  · 2. Patient will increase B scapular strength to 5/5 to allow for decreased pain with prolonged sitting and allow for proper postural alignment. · 3. Patient will increase FOTO score to at least 63/100 to indicate improved I with functional mobility. · 4. Patient will report decreased pain levels to 2/10 during and after activity to allow for return to PLOF.   · 5.  Patient will present with improved B hip strength to 5/5 including extension to allow for proper support along lumbar spine.        PLAN  [x]  Upgrade activities as tolerated     [x]  Continue plan of care  []  Update interventions per flow sheet       []  Discharge due to:_  [x]  Other:_    Fouzia Rodriguez PTA,    9/26/2019  1551 PM    Future Appointments   Date Time Provider Rosalia Lay   9/30/2019  2:45 PM [facility]   10/3/2019  2:45 PM Baird Dearth [facility]   10/7/2019  2:45 PM Annetta Morton PTA [facility]   10/10/2019  2:45 PM Camilo Mcmillan PT [facility]   7/28/2020 11:15 AM Arie Nuñez MD [address] [facility]

## 2019-09-30 ENCOUNTER — HOSPITAL ENCOUNTER (OUTPATIENT)
Dept: PHYSICAL THERAPY | Age: 74
Discharge: HOME | End: 2019-09-30
Payer: MEDICARE

## 2019-09-30 PROCEDURE — 97110 THERAPEUTIC EXERCISES: CPT

## 2019-09-30 PROCEDURE — 97530 THERAPEUTIC ACTIVITIES: CPT

## 2019-09-30 NOTE — PROGRESS NOTES
PT DAILY TREATMENT NOTE - [facility]     Patient Name: Idalia Sarkar.  Date:2019  : 1945  [x]  Patient  Verified  Payor: Anshul Narayan / Plan: [place] MED[place] PART A & B / Product Type: Medicare /    In time: 740 Out time: 704  Total Treatment Time (min):  47  Total Timed Codes (min): 47  1:1 Treatment Time ( only): 52  Visit #: 9 of 8-10    Treatment Area: Spinal stenosis, cervical region [M48.02]  Spinal stenosis, lumbar region without neurogenic claudication [M48.061]    SUBJECTIVE  Pain Level IN:(0-10 scale):  0/10 Neck, 5/10 Back  Pain Level OUT: (0-10 scale) post treatment: 0/10 Neck, 5/10 Back    Any medication changes, allergies to medications, adverse drug reactions, diagnosis change, or new procedure performed?: [x] No    [] Yes (see summary sheet for update)  Subjective functional status/changes:   [x] No changes reported  Patient has an appointment tomorrow due to parasthesias in R heel. OBJECTIVE    Modalities Rationale:     decrease inflammation, decrease pain and increase tissue extensibility to improve patient's ability to turn head to left and right with activities such as driving    min [] Estim, type/location:                                      []  att     []  unatt     []  w/US     []  w/ice    []  w/heat    min []  Mechanical Traction: type/lbs                   []  pro   []  sup   []  int   []  cont    []  before manual    []  after manual    min []  Ultrasound, settings/location:      min []  Iontophoresis w/ dexamethasone, location:                                               []  take home patch       []  in clinic   PD min []  Ice     [x]  Heat    location/position:  To lumbar spine     min []  Vasopneumatic Device, press/temp:     min []  Other:    [x] Skin assessment post-treatment (if applicable):    [x]  intact    []  redness- no adverse reaction     []redness - adverse reaction:        37 min Therapeutic Exercise:  [x] See flow sheet : Reassessment - issued final HEP; reviewed stretching HEP; FOTO   Rationale: increase ROM and increase strength to improve the patients ability to perform ADLs with decreased pain     10 min Therapeutic Activit:  [] See flow sheet :Reassessment; FOTO   Rationale: increase ROM and increase strength to improve the patients ability to perform ADLs with decreased pain      min Manual Therapy:  STM/DTM (R) QL and lumbar paraspinals in (L) side lying    Rationale: decrease pain, increase ROM, increase tissue extensibility, decrease trigger points and increase postural awareness to cervical spine             With   [x] TE   [] TA   [] neuro   [] other: Patient Education: [x] Review HEP    [] Progressed/Changed HEP based on:   [] positioning   [] body mechanics   [] transfers   [] heat/ice application    [] other:      Objective/Functional Measures with Therapist Assessment Noted with Response to Therapy Session:   Patient reports 100% improvement in neck pain since SOC. Patient reports 50% improvement in low back pian since [facility]. FOTO: 66/100  ROM: Rot R 72, L 65; Flex 58; Ext 35  MMT:   Hip - Flexion 5/5, Abd 5/5, Ext 3/5   Shoulder - LT 5/5, MT 5/5, Lat 5/5    ASSESSMENT/Changes in Function:   See discharge summary. []  See Plan of Care  []  See progress note/recertification  [x]  See Discharge Summary         Progress towards goals / Updated goals: · Short Term Goals: To be accomplished in  3  treatments:  · 1. Patient will demonstrate I and compliance with HEP to increase cervical ROM and increase scapular strength to demonstrate active role in rehab process. 9/30/19 MET     · Long Term Goals: To be accomplished in  10  treatments:  · 1. Patient will increase AROM to Cervical spine to WNL with B side bending and rotation without an increase in pain to allow for increased ease for driving. 1/04/29 Rot R 72, L 65; Flex 58; Ext 35  · 2.  Patient will increase B scapular strength to 5/5 to allow for decreased pain with prolonged sitting and allow for proper postural alignment. 9/30/19 LT 5/5, MT 5/5, Lat 5/5  · 3. Patient will increase FOTO score to at least 63/100 to indicate improved I with functional mobility. 9/30/19 MET 66/100  · 4. Patient will report decreased pain levels to 2/10 during and after activity to allow for return to PLOF. 9/30/10  Met for cervical spine for last 3 visits, Not met for lumbar spine  · 5. Patient will present with improved B hip strength to 5/5 including extension to allow for proper support along lumbar spine. 9/30/19 Flexion 5/5, Abd 5/5, Ext 3/5       PLAN  []  Upgrade activities as tolerated     []  Continue plan of care  []  Update interventions per flow sheet       []  Discharge due to:_  [x]  Other:_Discharge home with HEP to continue making gains towards goals.     Benson Nolasco, PT, DPT   9/30/2019  1545 PM    Future Appointments   Date Time Provider Rosalia Lay   9/30/2019  2:45 PM [facility] PT [facility] 2 [facility]   10/3/2019  2:45 PM Shoaib Lerma [facility]   10/7/2019  2:45 PM Padmini Arriola PTA [facility]   10/10/2019  2:45 PM Alicia Yousif PT [facility]   7/28/2020 11:15 AM Tara Tse MD [facility]

## 2019-09-30 NOTE — PROGRESS NOTES
[address] PHYSICAL THERAPY  [facility] [address] [facility] [address], [place], [place]  Phone [phone]  Fax [phone]  DISCHARGE SUMMARY FOR PHYSICAL THERAPY          Patient Name: Scotty Perez Sr. : 1945   Treatment/Medical Diagnosis: Spinal stenosis, cervical region [M48.02]  Spinal stenosis, lumbar region without neurogenic claudication [M48.061]   Onset Date: Chronic    Referral Source: Jarvis [facility]): 2019   Prior Hospitalization: See Medical History Provider #: [id number]   Prior Level of Function: Functionally I   Comorbidities: DM, HTN, Hx Right TKR   Medications: Verified on Patient Summary List   Visits from [facility]: 9 Missed Visits: 0       Goal/Measure of Progress Goal Met? · Short Term Goals: To be accomplished in  3  treatments:  · 1. Patient will demonstrate I and compliance with HEP to increase cervical ROM and increase scapular strength to demonstrate active role in rehab process. 19 MET     · Long Term Goals: To be accomplished in  10  treatments:  · 1. Patient will increase AROM to Cervical spine to WNL with B side bending and rotation without an increase in pain to allow for increased ease for driving.  MET Rot R 72, L 65; Flex 58; Ext 35  · 2. Patient will increase B scapular strength to 5/5 to allow for decreased pain with prolonged sitting and allow for proper postural alignment. 19 MET  LT 5/5, MT 5/5, Lat 5/5  · 3. Patient will increase FOTO score to at least 63/100 to indicate improved I with functional mobility. 19 MET 66/100  · 4. Patient will report decreased pain levels to 2/10 during and after activity to allow for return to PLOF. 9/30/10  MET for cervical spine for last 3 visits, Not met for lumbar spine  · 5.  Patient will present with improved B hip strength to 5/5 including extension to allow for proper support along lumbar spine. 19 Partially Met Flexion 5/5, Abd 5/5, Ext 3/5  ·   Key Functional Changes/Progress:   Patient reports 100% improvement in his neck pain since [facility] and 50% improvement in his low back pain. Patient improved  FOTO score by 9 points indicating overall improved functional mobility. Patient also improved cervical ROM in rotation, flexion and extension; improved scapular strength which improves patient's ability to achieve and maintain neutral cervical postures, and improvement in neck pain levels. G-Codes (GP): NA  Assessments/Recommendations: Discontinue therapy. Progressing towards or have reached established goals. If you have any questions/comments please contact us directly at [phone]. Thank you for allowing us to assist in the care of your patient.     Therapist Signature: Lexi Grimaldo Date: 9/30/19   Reporting Period: 8/30/19 - 11/29/19 Time: 3:45 PM

## 2019-10-03 ENCOUNTER — APPOINTMENT (OUTPATIENT)
Dept: PHYSICAL THERAPY | Age: 74
End: 2019-10-03

## 2019-10-07 ENCOUNTER — APPOINTMENT (OUTPATIENT)
Dept: PHYSICAL THERAPY | Age: 74
End: 2019-10-07

## 2019-10-10 ENCOUNTER — APPOINTMENT (OUTPATIENT)
Dept: PHYSICAL THERAPY | Age: 74
End: 2019-10-10

## 2019-11-18 ENCOUNTER — IMPORTED ENCOUNTER (OUTPATIENT)
Dept: URBAN - METROPOLITAN AREA CLINIC 1 | Facility: CLINIC | Age: 74
End: 2019-11-18

## 2019-11-18 PROBLEM — H01.001: Noted: 2019-11-18

## 2019-11-18 PROBLEM — Z79.84: Noted: 2019-11-18

## 2019-11-18 PROBLEM — H35.3111: Noted: 2019-11-18

## 2019-11-18 PROBLEM — E11.9: Noted: 2019-11-18

## 2019-11-18 PROBLEM — H16.143: Noted: 2019-11-18

## 2019-11-18 PROBLEM — H26.493: Noted: 2019-11-18

## 2019-11-18 PROBLEM — H01.004: Noted: 2019-11-18

## 2019-11-18 PROBLEM — H43.811: Noted: 2019-11-18

## 2019-11-18 PROBLEM — Z96.1: Noted: 2019-11-18

## 2019-11-18 PROBLEM — H04.123: Noted: 2019-11-18

## 2019-11-18 PROCEDURE — 92014 COMPRE OPH EXAM EST PT 1/>: CPT

## 2019-11-18 NOTE — PATIENT DISCUSSION
1.  DM Type II (Oral Medication) without sign of diabetic retinopathy and no blot heme on dilated retinal examination today OU No Macular Edema:  Discussed the pathophysiology of diabetes and its effect on the eye and risk of blindness. Stressed the importance of strong glucose control. Advised of importance of at least yearly dilated examinations but to contact us immediately for any problems or concerns. 2. ARMD OD Early/dry/stable. Importance of daily AREDS II study multivitamin and Amsler Grid checks discussed with patient. Patient to follow-up immediately with any new onset of decreased vision and/or metamorphopsia. 3. JAKE w/ PEK OU- Recommend ATs TID OU routinely 4. PCO OU: (Posterior Capsule Opacification)   Observe and consider yag cap when pt feels pco visually significant and visual acuity decreases to appropriate level. 5. Pseudophakia OU - Doing Well 6. Anterior Blepharitis OU - Daily Hot compresses and lid scrubs were recommended. 7. PVD w/o Tear OD - RD precautions. Patient deferred Manifest Rx today. Return for an appointment in 1 year 27 with Dr. Kamryn Moffett.

## 2020-11-17 ENCOUNTER — IMPORTED ENCOUNTER (OUTPATIENT)
Dept: URBAN - METROPOLITAN AREA CLINIC 1 | Facility: CLINIC | Age: 75
End: 2020-11-17

## 2020-11-17 PROBLEM — H04.123: Noted: 2020-11-17

## 2020-11-17 PROBLEM — E11.9: Noted: 2020-11-17

## 2020-11-17 PROBLEM — Z79.84: Noted: 2020-11-17

## 2020-11-17 PROBLEM — H16.143: Noted: 2020-11-17

## 2020-11-17 PROBLEM — H35.3111: Noted: 2020-11-17

## 2020-11-17 PROCEDURE — 92014 COMPRE OPH EXAM EST PT 1/>: CPT

## 2020-11-17 NOTE — PATIENT DISCUSSION
1.  DM Type II (Oral Meds) without sign of diabetic retinopathy and no blot heme on dilated retinal examination today OU No Macular Edema:  Discussed the pathophysiology of diabetes and its effect on the eye and risk of blindness. Stressed the importance of strong glucose control. Advised of importance of at least yearly dilated examinations but to contact us immediately for any problems or concerns. 2. ARMD OD Early/dry/stable. Importance of daily AREDS II study multivitamin and Amsler Grid checks discussed with patient. Patient to follow-up immediately with any new onset of decreased vision and/or metamorphopsia. 3. JAKE w/ PEK OU- Recommend increase ATs to five times a day OU routinely 4. PCO OU: (Posterior Capsule Opacification)   Observe and consider yag cap when pt feels pco visually significant and visual acuity decreases to appropriate level. 5. Pseudophakia OU - Doing Well 6. Anterior Blepharitis OU - Daily Hot compresses and lid scrubs were recommended. 7. PVD w/o Tear OD - RD precautions. Patient deferred Manifest Rx today. Return for an appointment in 1 year 27 with Dr. Cesar Dumont.

## 2020-12-18 ENCOUNTER — OFFICE VISIT (OUTPATIENT)
Dept: ORTHOPEDIC SURGERY | Age: 75
End: 2020-12-18
Payer: MEDICARE

## 2020-12-18 VITALS — BODY MASS INDEX: 32.13 KG/M2 | WEIGHT: 212 LBS | HEIGHT: 68 IN

## 2020-12-18 DIAGNOSIS — M17.11 PRIMARY OSTEOARTHRITIS OF RIGHT KNEE: Primary | ICD-10-CM

## 2020-12-18 PROCEDURE — 99213 OFFICE O/P EST LOW 20 MIN: CPT | Performed by: ORTHOPAEDIC SURGERY

## 2020-12-18 NOTE — PROGRESS NOTES
Name: Oscar Almodovar Sr.    : 1945     Service Dept: [address] [place] [address] Orthopaedics and Sports Medicine    Patient's Pharmacies:    [facility] [address] [place] [address], [facility] [id number]  [place] [address]  Phone: [phone] Fax: [phone]       Chief Complaint   Patient presents with    Knee Pain     yearly f/u        Visit Vitals  Ht 5' 8\" (1.727 m)   Wt 212 lb (96.2 kg)   BMI 32.23 kg/m²      No Known Allergies   Current Outpatient Medications   Medication Sig Dispense Refill    flecainide (TAMBOCOR) 50 mg tablet Take 100 mg by mouth two (2) times a day.  metoprolol succinate (TOPROL-XL) 50 mg XL tablet Take 50 mg by mouth daily.  ECONAZOLE NITRATE, BULK, by Does Not Apply route.  nystatin (MYCOSTATIN) 100,000 unit/gram ointment Apply  to affected area two (2) times a day. 15 g 0    terazosin (HYTRIN) 10 mg capsule Take 1 Cap by mouth nightly. 90 Cap 3    finasteride (PROSCAR) 5 mg tablet TAKE ONE TABLET BY MOUTH DAILY 90 Tab 3    apixaban (ELIQUIS) 5 mg tablet Take 5 mg by mouth two (2) times a day.  glipiZIDE (GLUCOTROL) 10 mg tablet Take 10 mg by mouth daily.  metFORMIN (GLUMETZA) 1,000 mg TG24 24 hour tablet Take 1 Tab by mouth two (2) times a day.  omeprazole (PRILOSEC) 20 mg capsule Take 20 mg by mouth daily.  simvastatin (ZOCOR) 80 mg tablet Take 80 mg by mouth nightly. Patient Active Problem List   Diagnosis Code    Precordial chest pain R07.2    DVT (deep vein thrombosis) in pregnancy O22.30      History reviewed. No pertinent family history.    Social History     Socioeconomic History    Marital status:      Spouse name: Not on file    Number of children: Not on file    Years of education: Not on file    Highest education level: Not on file   Tobacco Use    Smoking status: Former Smoker    Smokeless tobacco: Never Used   Substance and Sexual Activity    Alcohol use: Never Frequency: Never    Drug use: Never      Past Surgical History:   Procedure Laterality Date    HX CATARACT REMOVAL Bilateral     HX KNEE REPLACEMENT Right 06/08/2019    HX ORTHOPAEDIC      had metal in knee    HX PACEMAKER      HX PACEMAKER PLACEMENT        Past Medical History:   Diagnosis Date    A-fib ([facility] [address]     Anemia     BPH (benign prostatic hyperplasia)     Diabetes (HCC)     Gastrointestinal disorder     HLD (hyperlipidemia)     Hypertension     PLMD (periodic limb movement disorder)     Renal insufficiency     Sleep apnea     Urinary retention         I have reviewed and agree with PFSH and ROS and intake form in chart and the record. Review of Systems:   Patient is a pleasant appearing individual, appropriately dressed, well hydrated, well nourished, who is alert, appropriately oriented for age, and in no acute distress with a normal gait and normal affect who does not appear to be in any significant pain. Physical Exam:  Right knee - Neurovascularly intact with good cap refill, full range of motion and full strength, well healed incision noted, no swelling, no erythema, no instability. Left knee - Decrease range of motion with flexion, Some crepitation, Grossly neurovascularly intact, Good cap refill, No skin lesion, Moderate swelling, No gross instability, Some quadriceps weakness     Encounter Diagnoses     ICD-10-CM ICD-9-CM   1. Primary osteoarthritis of right knee  M17.11 715.16         HPI:  The patient is here status post right total knee replacement, doing well, has no complaints. Little bit of soreness. Pain is 8/10. ROS:  10-point review of systems is unremarkable. Assessment/Plan:  1. The patient is doing well. Plan at this point, activities as tolerated, weightbearing started, no restrictions. We will see the patient back on yearly appointment or as needed.   He is status post right total knee replacement with well-placed prosthesis, with no evidence of any significant swelling. Return to Office:    PRN     Scribed by Maria Isabel Pollack MD as dictated by Christiana Felix MD.  Documentation True and Accepted Jonathon Felix MD

## 2020-12-23 NOTE — PATIENT INSTRUCTIONS
Knee Pain or Injury: Care Instructions Your Care Instructions Injuries are a common cause of knee problems. Sudden (acute) injuries may be caused by a direct blow to the knee. They can also be caused by abnormal twisting, bending, or falling on the knee. Pain, bruising, or swelling may be severe, and may start within minutes of the injury. Overuse is another cause of knee pain. Other causes are climbing stairs, kneeling, and other activities that use the knee. Everyday wear and tear, especially as you get older, also can cause knee pain. Rest, along with home treatment, often relieves pain and allows your knee to heal. If you have a serious knee injury, you may need tests and treatment. Follow-up care is a key part of your treatment and safety. Be sure to make and go to all appointments, and call your doctor if you are having problems. It's also a good idea to know your test results and keep a list of the medicines you take. How can you care for yourself at home? · Be safe with medicines. Read and follow all instructions on the label. ? If the doctor gave you a prescription medicine for pain, take it as prescribed. ? If you are not taking a prescription pain medicine, ask your doctor if you can take an over-the-counter medicine. · Rest and protect your knee. Take a break from any activity that may cause pain. · Put ice or a cold pack on your knee for 10 to 20 minutes at a time. Put a thin cloth between the ice and your skin. · Prop up a sore knee on a pillow when you ice it or anytime you sit or lie down for the next 3 days. Try to keep it above the level of your heart. This will help reduce swelling. · If your knee is not swollen, you can put moist heat, a heating pad, or a warm cloth on your knee. · If your doctor recommends an elastic bandage, sleeve, or other type of support for your knee, wear it as directed. · Follow your doctor's instructions about how much weight you can put on your leg. Use a cane, crutches, or a walker as instructed. · Follow your doctor's instructions about activity during your healing process. If you can do mild exercise, slowly increase your activity. · Reach and stay at a healthy weight. Extra weight can strain the joints, especially the knees and hips, and make the pain worse. Losing even a few pounds may help. When should you call for help? Call 911 anytime you think you may need emergency care. For example, call if: 
  · You have symptoms of a blood clot in your lung (called a pulmonary embolism). These may include: 
? Sudden chest pain. ? Trouble breathing. ? Coughing up blood. Call your doctor now or seek immediate medical care if: 
  · You have severe or increasing pain.  
  · Your leg or foot turns cold or changes color.  
  · You cannot stand or put weight on your knee.  
  · Your knee looks twisted or bent out of shape.  
  · You cannot move your knee.  
  · You have signs of infection, such as: 
? Increased pain, swelling, warmth, or redness. ? Red streaks leading from the knee. ? Pus draining from a place on your knee. ? A fever.  
  · You have signs of a blood clot in your leg (called a deep vein thrombosis), such as: 
? Pain in your calf, back of the knee, thigh, or groin. ? Redness and swelling in your leg or groin. Watch closely for changes in your health, and be sure to contact your doctor if: 
  · You have tingling, weakness, or numbness in your knee.  
  · You have any new symptoms, such as swelling.  
  · You have bruises from a knee injury that last longer than 2 weeks.  
  · You do not get better as expected. Where can you learn more? Go to http://www.gray.com/ Enter K195 in the search box to learn more about \"Knee Pain or Injury: Care Instructions. \" Current as of: June 26, 2019               Content Version: 12.6 © 0121-5926 [employer]. Care instructions adapted under license by [employer] (which disclaims liability or warranty for this information). If you have questions about a medical condition or this instruction, always ask your healthcare professional. [address] any warranty or liability for your use of this information.

## 2021-02-02 NOTE — PROGRESS NOTES
[address] PHYSICAL THERAPY AT [facility]  [address] [facility] [address] [place] [address], [place]  Phone [phone]  Fax  SUMMARY  Patient Name: Vicky Randle Sr. : 1945   Treatment/Medical Diagnosis: Right knee pain [M25.561]   Referral Source: [facility]     Date of Initial Visit: 6/10/2019 Attended Visits: 12 Missed Visits: 0     Onset Date: 19       Referral Source: [facility]): 6/10/2019   Prior Hospitalization: See medical history Provider #: [id number]   Prior Level of Function: IND   Comorbidities: Diabetes, HBP   Medications: Verified on Patient Summary List         Summary of Care: Thank you for referring this pleasant patient. Ronen Hanson has completed 12 proposed sessions     Please refer to progress report written on 2019 for status of patient at that time on 10th visit - patient returned 2 additional visits and then did not return to PT. -   Will discharge patient at this time and if patient contact office after today will advise patient that is any additional follow up or recommendation is needed to return to referring physician. Medicare Reporting Period 6/10/2019-2019    Reason for Discharge:  [x] The patient was non-compliant with attendance. [x] The patient could not be contacted to schedule further therapy sessions. [] The patient has been discharged based on the orders of the referring physician.  [] The patient has requested to be discharged due to:   [] Patient has met all goals or max benefit has been achieved      If you have any questions/comments please contact us directly at [phone]. Thank you for allowing us to assist in the care of your patient.     Therapist Signature: Pamela Maurer PTA Date: 10/3/2019     Time: 9:44 AM Dr. Stack in department.  Updated on most recent temp, Dr. Rizzo saw patient, reviewed labs, aware FHT's were about 180 with most recent check, patient requesting to shower, orders rec'd.

## 2021-11-16 ENCOUNTER — IMPORTED ENCOUNTER (OUTPATIENT)
Dept: URBAN - METROPOLITAN AREA CLINIC 1 | Facility: CLINIC | Age: 76
End: 2021-11-16

## 2021-11-16 PROBLEM — H04.123: Noted: 2021-11-16

## 2021-11-16 PROBLEM — H16.143: Noted: 2021-11-16

## 2021-11-16 PROBLEM — E11.9: Noted: 2021-11-16

## 2021-11-16 PROBLEM — H35.3111: Noted: 2021-11-16

## 2021-11-16 PROBLEM — Z79.84: Noted: 2021-11-16

## 2021-11-16 PROCEDURE — 99214 OFFICE O/P EST MOD 30 MIN: CPT

## 2021-11-16 NOTE — PATIENT DISCUSSION
1.  DM Type II (Oral Meds) without sign of diabetic retinopathy and no blot heme on dilated retinal examination today OU No Macular Edema:  Discussed the pathophysiology of diabetes and its effect on the eye and risk of blindness. Stressed the importance of strong glucose control. Advised of importance of at least yearly dilated examinations but to contact us immediately for any problems or concerns. 2. ARMD OD Early/dry/stable. Importance of daily AREDS II study multivitamin and Amsler Grid checks discussed with patient. Patient to follow-up immediately with any new onset of decreased vision and/or metamorphopsia. 3. JAKE w/ PEK OU -- Recommend increase ATs to five times a day OU routinely 4. PCO OU (Posterior Capsule Opacification) -- Observe and consider yag cap when pt feels pco visually significant and visual acuity decreases to appropriate level. 5. Pseudophakia OU -- Doing Well 6. Anterior Blepharitis OU -- Daily Hot compresses and lid scrubs were recommended. 7. PVD w/o Tear OD -- RD precautions. Patient deferred Manifest Rx today. Return for an appointment in 1 year 27 with Dr. Elmer Mujica.

## 2022-04-02 ASSESSMENT — TONOMETRY
OS_IOP_MMHG: 14
OD_IOP_MMHG: 14
OD_IOP_MMHG: 15
OS_IOP_MMHG: 15
OD_IOP_MMHG: 15
OS_IOP_MMHG: 15
OD_IOP_MMHG: 14
OS_IOP_MMHG: 13
OS_IOP_MMHG: 13
OD_IOP_MMHG: 13
OD_IOP_MMHG: 13
OS_IOP_MMHG: 14

## 2022-04-02 ASSESSMENT — VISUAL ACUITY
OS_CC: 20/20
OS_CC: 20/25+2
OD_CC: 20/20
OS_CC: 20/20-1
OD_CC: 20/20
OS_CC: 20/20
OD_CC: 20/20
OS_CC: 20/20
OS_CC: 20/20
OD_CC: 20/20
OD_CC: 20/20-1
OS_CC: 20/20

## 2022-04-06 ENCOUNTER — EMERGENCY VISIT (OUTPATIENT)
Dept: URBAN - METROPOLITAN AREA CLINIC 1 | Facility: CLINIC | Age: 77
End: 2022-04-06

## 2022-04-06 DIAGNOSIS — H04.123: ICD-10-CM

## 2022-04-06 DIAGNOSIS — S05.02XA: ICD-10-CM

## 2022-04-06 DIAGNOSIS — H16.143: ICD-10-CM

## 2022-04-06 PROCEDURE — 92012 INTRM OPH EXAM EST PATIENT: CPT

## 2022-04-06 ASSESSMENT — VISUAL ACUITY
OS_SC: 20/20
OD_SC: 20/20

## 2022-04-06 ASSESSMENT — TONOMETRY
OD_IOP_MMHG: 13
OS_IOP_MMHG: 13

## 2022-04-11 ENCOUNTER — FOLLOW UP (OUTPATIENT)
Dept: URBAN - METROPOLITAN AREA CLINIC 1 | Facility: CLINIC | Age: 77
End: 2022-04-11

## 2022-04-11 DIAGNOSIS — S05.02XD: ICD-10-CM

## 2022-04-11 DIAGNOSIS — H26.493: ICD-10-CM

## 2022-04-11 DIAGNOSIS — H04.123: ICD-10-CM

## 2022-04-11 DIAGNOSIS — H01.024: ICD-10-CM

## 2022-04-11 DIAGNOSIS — H16.143: ICD-10-CM

## 2022-04-11 DIAGNOSIS — H01.021: ICD-10-CM

## 2022-04-11 DIAGNOSIS — Z96.1: ICD-10-CM

## 2022-04-11 PROCEDURE — 92012 INTRM OPH EXAM EST PATIENT: CPT

## 2022-04-11 ASSESSMENT — TONOMETRY
OS_IOP_MMHG: 16
OD_IOP_MMHG: 14

## 2022-04-11 ASSESSMENT — VISUAL ACUITY
OS_SC: 20/25
OD_SC: 20/20

## 2022-11-15 ENCOUNTER — COMPREHENSIVE EXAM (OUTPATIENT)
Dept: URBAN - METROPOLITAN AREA CLINIC 1 | Facility: CLINIC | Age: 77
End: 2022-11-15

## 2022-11-15 DIAGNOSIS — H35.3111: ICD-10-CM

## 2022-11-15 DIAGNOSIS — H04.123: ICD-10-CM

## 2022-11-15 DIAGNOSIS — Z96.1: ICD-10-CM

## 2022-11-15 DIAGNOSIS — H16.143: ICD-10-CM

## 2022-11-15 DIAGNOSIS — H43.811: ICD-10-CM

## 2022-11-15 DIAGNOSIS — H01.024: ICD-10-CM

## 2022-11-15 DIAGNOSIS — E11.3291: ICD-10-CM

## 2022-11-15 DIAGNOSIS — H01.021: ICD-10-CM

## 2022-11-15 DIAGNOSIS — H26.493: ICD-10-CM

## 2022-11-15 PROCEDURE — 99214 OFFICE O/P EST MOD 30 MIN: CPT

## 2022-11-15 ASSESSMENT — TONOMETRY
OS_IOP_MMHG: 13
OD_IOP_MMHG: 13

## 2022-11-15 ASSESSMENT — VISUAL ACUITY
OS_SC: 20/20
OD_SC: 20/20-1
OU_SC: J1
OU_CC: J1+

## 2022-11-15 NOTE — PATIENT DISCUSSION
No evidence of Diabetic Retinopathy. Island Pedicle Flap With Canthal Suspension Text: The defect edges were debeveled with a #15 scalpel blade.  Given the location of the defect, shape of the defect and the proximity to free margins an island pedicle advancement flap was deemed most appropriate.  Using a sterile surgical marker, an appropriate advancement flap was drawn incorporating the defect, outlining the appropriate donor tissue and placing the expected incisions within the relaxed skin tension lines where possible. The area thus outlined was incised deep to adipose tissue with a #15 scalpel blade.  The skin margins were undermined to an appropriate distance in all directions around the primary defect and laterally outward around the island pedicle utilizing iris scissors.  There was minimal undermining beneath the pedicle flap. A suspension suture was placed in the canthal tendon to prevent tension and prevent ectropion.

## 2022-11-15 NOTE — PATIENT DISCUSSION
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 Stressed the importance of strong glucose control. Advised the importance of at least yearly dilated examinations, but to contact us immediately for any problems or concerns.

## 2023-01-06 ENCOUNTER — CLINIC PROCEDURE ONLY (OUTPATIENT)
Dept: URBAN - METROPOLITAN AREA CLINIC 1 | Facility: CLINIC | Age: 78
End: 2023-01-06

## 2023-01-06 DIAGNOSIS — H26.493: ICD-10-CM

## 2023-01-06 DIAGNOSIS — Z96.1: ICD-10-CM

## 2023-01-06 PROCEDURE — 66821 AFTER CATARACT LASER SURGERY: CPT

## 2023-01-06 NOTE — PROCEDURE NOTE: CLINICAL
PROCEDURE NOTE: YAG Capsulotomy OD. Diagnosis: Posterior Capsular Opacity. Anesthesia: Topical. The purpose and nature of the procedure, possible alternative methods of treatment, the risks involved and the possibility of complications were discussed with patient. The Patient wishes to proceed and the consent was signed. 1 gtt Prolensa applied. The laser was then performed under topical anesthesia with no complications. Post op instructions were given to patient as well as a follow-up appointment. Patient was advised to call our office if any questions or concerns. Latrice Escobedo

## 2023-01-20 ENCOUNTER — CLINIC PROCEDURE ONLY (OUTPATIENT)
Dept: URBAN - METROPOLITAN AREA CLINIC 1 | Facility: CLINIC | Age: 78
End: 2023-01-20

## 2023-01-20 DIAGNOSIS — Z96.1: ICD-10-CM

## 2023-01-20 DIAGNOSIS — H26.492: ICD-10-CM

## 2023-01-20 PROCEDURE — 66821 AFTER CATARACT LASER SURGERY: CPT

## 2023-01-20 NOTE — PROCEDURE NOTE: CLINICAL
PROCEDURE NOTE: YAG Capsulotomy OS. Diagnosis: Posterior Capsular Opacity. Anesthesia: Topical. The purpose and nature of the procedure, possible alternative methods of treatment, the risks involved and the possibility of complications were discussed with patient. The Patient wishes to proceed and the consent was signed. 1 gtt Prolensa applied. The laser was then performed under topical anesthesia with no complications. Post op instructions were given to patient as well as a follow-up appointment. Patient was advised to call our office if any questions or concerns. Anjali Jiménez

## 2023-06-19 ENCOUNTER — FOLLOW UP (OUTPATIENT)
Dept: URBAN - METROPOLITAN AREA CLINIC 1 | Facility: CLINIC | Age: 78
End: 2023-06-19

## 2023-06-19 DIAGNOSIS — H35.3111: ICD-10-CM

## 2023-06-19 DIAGNOSIS — H01.021: ICD-10-CM

## 2023-06-19 DIAGNOSIS — H01.024: ICD-10-CM

## 2023-06-19 DIAGNOSIS — H16.143: ICD-10-CM

## 2023-06-19 DIAGNOSIS — E11.3291: ICD-10-CM

## 2023-06-19 DIAGNOSIS — H04.123: ICD-10-CM

## 2023-06-19 PROCEDURE — 99213 OFFICE O/P EST LOW 20 MIN: CPT

## 2023-06-19 ASSESSMENT — TONOMETRY
OD_IOP_MMHG: 14
OS_IOP_MMHG: 13

## 2023-06-19 ASSESSMENT — VISUAL ACUITY
OD_SC: 20/20
OS_SC: 20/20-2

## 2023-12-22 ENCOUNTER — COMPREHENSIVE EXAM (OUTPATIENT)
Dept: URBAN - METROPOLITAN AREA CLINIC 1 | Facility: CLINIC | Age: 78
End: 2023-12-22

## 2023-12-22 DIAGNOSIS — H43.811: ICD-10-CM

## 2023-12-22 DIAGNOSIS — H16.143: ICD-10-CM

## 2023-12-22 DIAGNOSIS — E11.3291: ICD-10-CM

## 2023-12-22 DIAGNOSIS — H35.3111: ICD-10-CM

## 2023-12-22 DIAGNOSIS — H04.123: ICD-10-CM

## 2023-12-22 PROCEDURE — 99214 OFFICE O/P EST MOD 30 MIN: CPT

## 2023-12-22 ASSESSMENT — VISUAL ACUITY
OD_SC: J5
OS_SC: 20/25-2
OS_SC: J5
OD_SC: 20/25-2

## 2023-12-22 ASSESSMENT — TONOMETRY
OS_IOP_MMHG: 10
OD_IOP_MMHG: 12

## 2024-07-01 ENCOUNTER — FOLLOW UP (OUTPATIENT)
Dept: URBAN - METROPOLITAN AREA CLINIC 1 | Facility: CLINIC | Age: 79
End: 2024-07-01

## 2024-07-01 DIAGNOSIS — E11.3291: ICD-10-CM

## 2024-07-01 DIAGNOSIS — H35.3111: ICD-10-CM

## 2024-07-01 PROCEDURE — 92134 CPTRZ OPH DX IMG PST SGM RTA: CPT

## 2024-07-01 PROCEDURE — 99213 OFFICE O/P EST LOW 20 MIN: CPT

## 2024-07-01 ASSESSMENT — TONOMETRY
OD_IOP_MMHG: 15
OS_IOP_MMHG: 15

## 2024-07-01 ASSESSMENT — VISUAL ACUITY
OS_CC: J1+
OS_SC: 20/25
OD_CC: J1+
OD_SC: 20/20

## 2025-01-31 ENCOUNTER — COMPREHENSIVE EXAM (OUTPATIENT)
Age: 80
End: 2025-01-31

## 2025-01-31 DIAGNOSIS — H01.024: ICD-10-CM

## 2025-01-31 DIAGNOSIS — H43.811: ICD-10-CM

## 2025-01-31 DIAGNOSIS — H16.143: ICD-10-CM

## 2025-01-31 DIAGNOSIS — E11.3291: ICD-10-CM

## 2025-01-31 DIAGNOSIS — H04.123: ICD-10-CM

## 2025-01-31 DIAGNOSIS — H35.3111: ICD-10-CM

## 2025-01-31 DIAGNOSIS — Z96.1: ICD-10-CM

## 2025-01-31 DIAGNOSIS — H01.021: ICD-10-CM

## 2025-01-31 PROCEDURE — 99214 OFFICE O/P EST MOD 30 MIN: CPT

## 2025-08-15 ENCOUNTER — FOLLOW UP (OUTPATIENT)
Age: 80
End: 2025-08-15

## 2025-08-15 DIAGNOSIS — H01.024: ICD-10-CM

## 2025-08-15 DIAGNOSIS — H01.021: ICD-10-CM

## 2025-08-15 DIAGNOSIS — E11.3291: ICD-10-CM

## 2025-08-15 DIAGNOSIS — H35.3111: ICD-10-CM

## 2025-08-15 DIAGNOSIS — H04.123: ICD-10-CM

## 2025-08-15 PROCEDURE — 99213 OFFICE O/P EST LOW 20 MIN: CPT

## 2025-08-15 PROCEDURE — 92134 CPTRZ OPH DX IMG PST SGM RTA: CPT
